# Patient Record
Sex: FEMALE | Race: WHITE | HISPANIC OR LATINO | URBAN - METROPOLITAN AREA
[De-identification: names, ages, dates, MRNs, and addresses within clinical notes are randomized per-mention and may not be internally consistent; named-entity substitution may affect disease eponyms.]

---

## 2024-08-20 ENCOUNTER — HOSPITAL ENCOUNTER (EMERGENCY)
Facility: HOSPITAL | Age: 39
Discharge: HOME | End: 2024-08-20
Attending: OBSTETRICS & GYNECOLOGY
Payer: MEDICAID

## 2024-08-20 ENCOUNTER — HOSPITAL ENCOUNTER (EMERGENCY)
Facility: HOSPITAL | Age: 39
Discharge: ANOTHER HOSPITAL | End: 2024-08-20
Attending: EMERGENCY MEDICINE
Payer: MEDICAID

## 2024-08-20 VITALS
TEMPERATURE: 99 F | WEIGHT: 200 LBS | RESPIRATION RATE: 19 BRPM | HEART RATE: 76 BPM | SYSTOLIC BLOOD PRESSURE: 117 MMHG | DIASTOLIC BLOOD PRESSURE: 65 MMHG | OXYGEN SATURATION: 100 %

## 2024-08-20 VITALS
TEMPERATURE: 98 F | DIASTOLIC BLOOD PRESSURE: 70 MMHG | OXYGEN SATURATION: 99 % | HEART RATE: 83 BPM | SYSTOLIC BLOOD PRESSURE: 127 MMHG

## 2024-08-20 DIAGNOSIS — R10.13 EPIGASTRIC PAIN: Primary | ICD-10-CM

## 2024-08-20 DIAGNOSIS — O47.9 FALSE LABOR: Primary | ICD-10-CM

## 2024-08-20 DIAGNOSIS — O09.30 NO PRENATAL CARE IN CURRENT PREGNANCY: ICD-10-CM

## 2024-08-20 LAB
ALBUMIN SERPL-MCNC: 2.9 G/DL (ref 3.5–5)
ALBUMIN/GLOB SERPL: 0.8 RATIO (ref 1.1–2)
ALP SERPL-CCNC: 137 UNIT/L (ref 40–150)
ALT SERPL-CCNC: 16 UNIT/L (ref 0–55)
ANION GAP SERPL CALC-SCNC: 11 MEQ/L
AST SERPL-CCNC: 15 UNIT/L (ref 5–34)
BACTERIA #/AREA URNS AUTO: ABNORMAL /HPF
BACTERIA #/AREA URNS AUTO: ABNORMAL /HPF
BASOPHILS # BLD AUTO: 0.02 X10(3)/MCL
BASOPHILS NFR BLD AUTO: 0.3 %
BILIRUB SERPL-MCNC: 0.3 MG/DL
BILIRUB UR QL STRIP.AUTO: NEGATIVE
BILIRUB UR QL STRIP.AUTO: NEGATIVE
BUN SERPL-MCNC: 4 MG/DL (ref 7–18.7)
C TRACH DNA SPEC QL NAA+PROBE: NOT DETECTED
CALCIUM SERPL-MCNC: 8.7 MG/DL (ref 8.4–10.2)
CHLORIDE SERPL-SCNC: 109 MMOL/L (ref 98–107)
CLARITY UR: CLEAR
CLARITY UR: CLEAR
CO2 SERPL-SCNC: 18 MMOL/L (ref 22–29)
COLOR UR AUTO: ABNORMAL
COLOR UR AUTO: COLORLESS
CREAT SERPL-MCNC: 0.55 MG/DL (ref 0.55–1.02)
CREAT/UREA NIT SERPL: 7
EOSINOPHIL # BLD AUTO: 0.08 X10(3)/MCL (ref 0–0.9)
EOSINOPHIL NFR BLD AUTO: 1 %
ERYTHROCYTE [DISTWIDTH] IN BLOOD BY AUTOMATED COUNT: 14.3 % (ref 11.5–17)
EST. AVERAGE GLUCOSE BLD GHB EST-MCNC: 128.4 MG/DL
GFR SERPLBLD CREATININE-BSD FMLA CKD-EPI: >60 ML/MIN/1.73/M2
GLOBULIN SER-MCNC: 3.8 GM/DL (ref 2.4–3.5)
GLUCOSE SERPL-MCNC: 100 MG/DL (ref 74–100)
GLUCOSE UR QL STRIP: ABNORMAL
GLUCOSE UR QL STRIP: NORMAL
GROUP & RH: NORMAL
HBA1C MFR BLD: 6.1 %
HBV SURFACE AG SERPL QL IA: NONREACTIVE
HCT VFR BLD AUTO: 34.9 % (ref 37–47)
HCV AB SERPL QL IA: NONREACTIVE
HGB BLD-MCNC: 11.2 G/DL (ref 12–16)
HGB UR QL STRIP: NEGATIVE
HGB UR QL STRIP: NEGATIVE
HIV 1+2 AB+HIV1 P24 AG SERPL QL IA: NONREACTIVE
HYALINE CASTS #/AREA URNS LPF: ABNORMAL /LPF
IMM GRANULOCYTES # BLD AUTO: 0.03 X10(3)/MCL (ref 0–0.04)
IMM GRANULOCYTES NFR BLD AUTO: 0.4 %
INDIRECT COOMBS: NORMAL
KETONES UR QL STRIP: ABNORMAL
KETONES UR QL STRIP: NEGATIVE
LEUKOCYTE ESTERASE UR QL STRIP: NEGATIVE
LEUKOCYTE ESTERASE UR QL STRIP: NEGATIVE
LYMPHOCYTES # BLD AUTO: 2 X10(3)/MCL (ref 0.6–4.6)
LYMPHOCYTES NFR BLD AUTO: 25 %
MCH RBC QN AUTO: 28.5 PG (ref 27–31)
MCHC RBC AUTO-ENTMCNC: 32.1 G/DL (ref 33–36)
MCV RBC AUTO: 88.8 FL (ref 80–94)
MONOCYTES # BLD AUTO: 0.55 X10(3)/MCL (ref 0.1–1.3)
MONOCYTES NFR BLD AUTO: 6.9 %
MUCOUS THREADS URNS QL MICRO: ABNORMAL /LPF
MUCOUS THREADS URNS QL MICRO: ABNORMAL /LPF
N GONORRHOEA DNA SPEC QL NAA+PROBE: NOT DETECTED
NEUTROPHILS # BLD AUTO: 5.31 X10(3)/MCL (ref 2.1–9.2)
NEUTROPHILS NFR BLD AUTO: 66.4 %
NITRITE UR QL STRIP: NEGATIVE
NITRITE UR QL STRIP: NEGATIVE
NRBC BLD AUTO-RTO: 0 %
PH UR STRIP: 7 [PH]
PH UR STRIP: 7.5 [PH]
PLATELET # BLD AUTO: 239 X10(3)/MCL (ref 130–400)
PMV BLD AUTO: 10.1 FL (ref 7.4–10.4)
POTASSIUM SERPL-SCNC: 3.6 MMOL/L (ref 3.5–5.1)
PROT SERPL-MCNC: 6.7 GM/DL (ref 6.4–8.3)
PROT UR QL STRIP: NEGATIVE
PROT UR QL STRIP: NEGATIVE
RBC # BLD AUTO: 3.93 X10(6)/MCL (ref 4.2–5.4)
RBC #/AREA URNS AUTO: ABNORMAL /HPF
RBC #/AREA URNS AUTO: ABNORMAL /HPF
SODIUM SERPL-SCNC: 138 MMOL/L (ref 136–145)
SOURCE (OHS): NORMAL
SP GR UR STRIP.AUTO: 1.01 (ref 1–1.03)
SP GR UR STRIP.AUTO: 1.01 (ref 1–1.03)
SPECIMEN OUTDATE: NORMAL
SQUAMOUS #/AREA URNS LPF: ABNORMAL /HPF
SQUAMOUS #/AREA URNS LPF: ABNORMAL /HPF
T PALLIDUM AB SER QL: NONREACTIVE
UROBILINOGEN UR STRIP-ACNC: NORMAL
UROBILINOGEN UR STRIP-ACNC: NORMAL
WBC # BLD AUTO: 7.99 X10(3)/MCL (ref 4.5–11.5)
WBC #/AREA URNS AUTO: ABNORMAL /HPF
WBC #/AREA URNS AUTO: ABNORMAL /HPF

## 2024-08-20 PROCEDURE — 86901 BLOOD TYPING SEROLOGIC RH(D): CPT | Performed by: OBSTETRICS & GYNECOLOGY

## 2024-08-20 PROCEDURE — 80053 COMPREHEN METABOLIC PANEL: CPT | Performed by: OBSTETRICS & GYNECOLOGY

## 2024-08-20 PROCEDURE — 87591 N.GONORRHOEAE DNA AMP PROB: CPT | Performed by: OBSTETRICS & GYNECOLOGY

## 2024-08-20 PROCEDURE — 86803 HEPATITIS C AB TEST: CPT | Performed by: OBSTETRICS & GYNECOLOGY

## 2024-08-20 PROCEDURE — 86780 TREPONEMA PALLIDUM: CPT | Performed by: OBSTETRICS & GYNECOLOGY

## 2024-08-20 PROCEDURE — 87389 HIV-1 AG W/HIV-1&-2 AB AG IA: CPT | Performed by: OBSTETRICS & GYNECOLOGY

## 2024-08-20 PROCEDURE — 99284 EMERGENCY DEPT VISIT MOD MDM: CPT | Mod: 25

## 2024-08-20 PROCEDURE — 87491 CHLMYD TRACH DNA AMP PROBE: CPT | Performed by: OBSTETRICS & GYNECOLOGY

## 2024-08-20 PROCEDURE — 81001 URINALYSIS AUTO W/SCOPE: CPT | Performed by: OBSTETRICS & GYNECOLOGY

## 2024-08-20 PROCEDURE — 83036 HEMOGLOBIN GLYCOSYLATED A1C: CPT | Performed by: OBSTETRICS & GYNECOLOGY

## 2024-08-20 PROCEDURE — 96360 HYDRATION IV INFUSION INIT: CPT

## 2024-08-20 PROCEDURE — 63600175 PHARM REV CODE 636 W HCPCS: Performed by: EMERGENCY MEDICINE

## 2024-08-20 PROCEDURE — 87340 HEPATITIS B SURFACE AG IA: CPT | Performed by: OBSTETRICS & GYNECOLOGY

## 2024-08-20 PROCEDURE — 86762 RUBELLA ANTIBODY: CPT | Performed by: OBSTETRICS & GYNECOLOGY

## 2024-08-20 PROCEDURE — 86850 RBC ANTIBODY SCREEN: CPT | Performed by: OBSTETRICS & GYNECOLOGY

## 2024-08-20 PROCEDURE — 85025 COMPLETE CBC W/AUTO DIFF WBC: CPT | Performed by: OBSTETRICS & GYNECOLOGY

## 2024-08-20 PROCEDURE — 81001 URINALYSIS AUTO W/SCOPE: CPT | Performed by: EMERGENCY MEDICINE

## 2024-08-20 PROCEDURE — 99285 EMERGENCY DEPT VISIT HI MDM: CPT | Mod: 25,27

## 2024-08-20 RX ORDER — LANOLIN ALCOHOL/MO/W.PET/CERES
1 CREAM (GRAM) TOPICAL DAILY
Status: DISCONTINUED | OUTPATIENT
Start: 2024-08-20 | End: 2024-08-20 | Stop reason: HOSPADM

## 2024-08-20 RX ORDER — DOCUSATE SODIUM 100 MG/1
100 CAPSULE, LIQUID FILLED ORAL 2 TIMES DAILY
Qty: 60 CAPSULE | Refills: 0 | Status: SHIPPED | OUTPATIENT
Start: 2024-08-20

## 2024-08-20 RX ADMIN — SODIUM CHLORIDE, POTASSIUM CHLORIDE, SODIUM LACTATE AND CALCIUM CHLORIDE 1000 ML: 600; 310; 30; 20 INJECTION, SOLUTION INTRAVENOUS at 09:08

## 2024-08-20 NOTE — ED PROVIDER NOTES
ED PROVIDER NOTE  2024    CHIEF COMPLAINT:   Chief Complaint   Patient presents with    Abdominal Pain     Pt in with complaints of abdominal pain x 3 days. Pt reports being 8 months pregnant with no prenatal care. .  Pt has no complaints of vaginal bleeding or fluids.       HISTORY OF PRESENT ILLNESS:   Camilla Cruz is a 39 y.o. female who presents with chief complaint Cramping.  Patient is  approximately 31 weeks gestation based on LMP who presents with uterine cramping that began 3 days ago. Nothing makes it better or worse. Denies vaginal bleeding or gush of fluid.    The history is provided by the patient. The history is limited by a language barrier. A  was used.         REVIEW OF SYSTEMS: as noted in the HPI.  NURSING NOTES REVIEWED      PAST MEDICAL/SURGICAL HISTORY: History reviewed. No pertinent past medical history. History reviewed. No pertinent surgical history.    FAMILY HISTORY: No family history on file.    SOCIAL HISTORY:   Social History     Tobacco Use    Smoking status: Never    Smokeless tobacco: Never   Substance Use Topics    Alcohol use: Never    Drug use: Never       ALLERGIES: Review of patient's allergies indicates:  No Known Allergies    PHYSICAL EXAM:  Initial Vitals [24 0922]   BP Pulse Resp Temp SpO2   127/74 81 14 98.6 °F (37 °C) 97 %      MAP       --         Physical Exam    Nursing note and vitals reviewed.  Constitutional: She appears well-developed and well-nourished.   HENT:   Head: Normocephalic and atraumatic.   Mouth/Throat: Uvula is midline and mucous membranes are normal.   Eyes: EOM are normal. Pupils are equal, round, and reactive to light.   Neck: Trachea normal. Neck supple.   Cardiovascular:  Normal rate, regular rhythm, intact distal pulses and normal pulses.           Pulmonary/Chest: Effort normal and breath sounds normal.   Abdominal: Abdomen is soft. Bowel sounds are normal.   Gravid abdomen There is no rebound  and no guarding.   Genitourinary:    Pelvic exam was performed with patient supine.      Genitourinary Comments: Cervical os closed.     Musculoskeletal:         General: Normal range of motion.      Cervical back: Neck supple.     Neurological: She is alert and oriented to person, place, and time. GCS eye subscore is 4. GCS verbal subscore is 5. GCS motor subscore is 6.   Skin: Skin is warm and dry.   Psychiatric: She has a normal mood and affect. Her speech is normal. Thought content normal.         RESULTS:  Labs Reviewed   URINALYSIS, REFLEX TO URINE CULTURE - Abnormal       Result Value    Color, UA Light-Yellow      Appearance, UA Clear      Specific Gravity, UA 1.013      pH, UA 7.0      Protein, UA Negative      Glucose, UA 1+ (*)     Ketones, UA Negative      Blood, UA Negative      Bilirubin, UA Negative      Urobilinogen, UA Normal      Nitrites, UA Negative      Leukocyte Esterase, UA Negative      RBC, UA 0-5      WBC, UA 0-5      Bacteria, UA None Seen      Squamous Epithelial Cells, UA Trace (*)     Mucous, UA Trace (*)     Hyaline Casts, UA None Seen       Imaging Results    None         PROCEDURES:  Procedures    ECG:       ED COURSE AND MEDICAL DECISION MAKING:  Medications   lactated ringers bolus 1,000 mL (0 mLs Intravenous Stopped 24 1032)     ED Course as of 24 0700   Tue Aug 20, 2024   1006 NITRITE UA: Negative [IB]   1006 Leukocyte Esterase, UA: Negative [IB]   1006 RBC, UA: 0-5 [IB]   1006 WBC, UA: 0-5 [IB]   1006 Bacteria, UA: None Seen [IB]      ED Course User Index  [IB] Sly Merino DO        Medical Decision Making  39-year-old female  approximately 31 weeks gestation based on LMP no prenatal care presents with uterine contractions for the past 3 days that she states occurs about every hour, denies any vaginal bleeding or gushing of fluid.  Cervical os closed.  Good fetal heart rate variability on tocodynamometer, no organized uterine contractions seen.  Transfer  process initiated so that she could have OB monitoring.  I have spoken with the patient and/or caregivers. I have explained the patient's condition, diagnoses and treatment plan based on the information available to me at this time. I have answered the patient's and/or caregiver's questions and addressed any concerns. The patient and/or caregivers have as good an understanding of the patient's diagnosis, condition and treatment plan as can be expected at this point. The patient has been stabilized within the capability of the emergency department. The patient will be transported for further care and management or will be moved to an observation or inpatient service. I have communicated with the staff or medical practitioner taking over this patient's care.    Amount and/or Complexity of Data Reviewed  Labs: ordered. Decision-making details documented in ED Course.    Risk  Decision regarding hospitalization.        CLINICAL IMPRESSION:  1. False labor        DISPOSITION:   ED Disposition Condition    Transfer to Another Facility Stable                    Sly Merino,   08/20/24 0941       Sly Merino,   08/21/24 0700

## 2024-08-20 NOTE — ED PROVIDER NOTES
Encounter Date: 8/20/2024       History     Chief Complaint   Patient presents with    Abdominal Pain     IUP 31.3 wks c/o of abdominal since 3 days ago.Patient denies leaking of fluid and vaginal bleeding. Patient has no prenatal care during this pregnancy. Patient has an appointment with the [facility] clinc for Sept 24.     HPI  Review of patient's allergies indicates:  No Known Allergies  History reviewed. No pertinent past medical history.  History reviewed. No pertinent surgical history.  No family history on file.  Social History     Tobacco Use    Smoking status: Never    Smokeless tobacco: Never   Substance Use Topics    Alcohol use: Never    Drug use: Never     Review of Systems    Physical Exam     Initial Vitals   BP Pulse Resp Temp SpO2   08/20/24 1158 08/20/24 1158 -- 08/20/24 1158 08/20/24 1200   127/70 81  98.2 °F (36.8 °C) 99 %      MAP       --                Physical Exam    ED Course   Procedures  Labs Reviewed   URINALYSIS, REFLEX TO URINE CULTURE - Abnormal       Result Value    Color, UA Colorless      Appearance, UA Clear      Specific Gravity, UA 1.010      pH, UA 7.5      Protein, UA Negative      Glucose, UA Normal      Ketones, UA 1+ (*)     Blood, UA Negative      Bilirubin, UA Negative      Urobilinogen, UA Normal      Nitrites, UA Negative      Leukocyte Esterase, UA Negative      RBC, UA None Seen      WBC, UA 0-5      Bacteria, UA None Seen      Squamous Epithelial Cells, UA Trace      Mucous, UA Trace (*)    COMPREHENSIVE METABOLIC PANEL - Abnormal    Sodium 138      Potassium 3.6      Chloride 109 (*)     CO2 18 (*)     Glucose 100      Blood Urea Nitrogen 4.0 (*)     Creatinine 0.55      Calcium 8.7      Protein Total 6.7      Albumin 2.9 (*)     Globulin 3.8 (*)     Albumin/Globulin Ratio 0.8 (*)     Bilirubin Total 0.3            ALT 16      AST 15      eGFR >60      Anion Gap 11.0      BUN/Creatinine Ratio 7     CBC WITH DIFFERENTIAL - Abnormal    WBC 7.99      RBC 3.93 (*)      Hgb 11.2 (*)     Hct 34.9 (*)     MCV 88.8      MCH 28.5      MCHC 32.1 (*)     RDW 14.3      Platelet 239      MPV 10.1      Neut % 66.4      Lymph % 25.0      Mono % 6.9      Eos % 1.0      Basophil % 0.3      Lymph # 2.00      Neut # 5.31      Mono # 0.55      Eos # 0.08      Baso # 0.02      IG# 0.03      IG% 0.4      NRBC% 0.0     SYPHILIS ANTIBODY (WITH REFLEX RPR) - Normal    Syphilis Antibody Nonreactive     CHLAMYDIA/GONORRHOEAE(GC), PCR    Chlamydia trachomatis PCR Not Detected      N. gonorrhea PCR Not Detected      Source Urine      Narrative:     The Xpert CT/NG test, performed on the GeneXpert system is a qualitative in vitro real-time polymerase chain reaction (PCR) test for the automated detected and differentiation for genomic DNA from Chlamydia trachomatis (CT) and/or Neisseria gonorrhoeae (NG).   HEMOGLOBIN A1C    Hemoglobin A1c 6.1      Estimated Average Glucose 128.4     CBC W/ AUTO DIFFERENTIAL    Narrative:     The following orders were created for panel order CBC auto differential.  Procedure                               Abnormality         Status                     ---------                               -----------         ------                     CBC with Differential[[id number]]       Abnormal            Final result                 Please view results for these tests on the individual orders.   RUBELLA ANTIBODY, IGG   HIV 1 / 2 ANTIBODY   HEPATITIS B SURFACE ANTIGEN   HEPATITIS C ANTIBODY   TYPE & SCREEN    Group & Rh A POS      Indirect Sue GEL NEG      Specimen Outdate 08/23/2024 23:59     ABORH RETYPE          Imaging Results              US OB Limited 1 Or More Gestations (Final result)  Result time 08/20/24 13:59:24      Final result by Laz Ly MD (08/20/24 13:59:24)                   Impression:      Single intrauterine gestation with average ultrasound age 31 weeks 6 days.  Biophysical profile 8/8.      Electronically signed by: Laz  Aliyah  Date:    08/20/2024  Time:    13:59               Narrative:    EXAMINATION:  US OB 14+ WEEKS, TRANSABDOM W/ BIOPHYSICAL PROFILE, W/O NST, SINGLE GESTATION (XPD); US OB LIMITED 1 OR MORE GESTATIONS    CLINICAL HISTORY:  No prenatal care    COMPARISON:  No priors    FINDINGS:  There is a single intrauterine gestation demonstrated in breech presentation. Fetal heart rate was 139 BPM.  Anterior placenta without abnormality evident.  Detailed fetal anatomic survey was not performed.  The VIVI is 17 cm.  Single deepest pocket 6 cm.    Average ultrasound age is 31 weeks 6 days.    BPD = 8.1 cm-32 weeks 5 days.    HC = 9.2 cm-32 weeks 2 days.    AC = 28.3 cm-32 weeks 2 days.    FL = 5.7 cm-30 weeks 0 days.    Estimated fetal weight-1811 grams +/-272 grams (4 pounds 0 ounces +/-10 ounces).  This is 51st percentile.    Biophysical profile 8/8.                                       US OB 14+ Weeks TransAbd, w/Biophysical Profile, w/o NST, Single Gestation (xpd) (Final result)  Result time 08/20/24 13:59:24      Final result by Laz Ly MD (08/20/24 13:59:24)                   Impression:      Single intrauterine gestation with average ultrasound age 31 weeks 6 days.  Biophysical profile 8/8.      Electronically signed by: Laz Ly  Date:    08/20/2024  Time:    13:59               Narrative:    EXAMINATION:  US OB 14+ WEEKS, TRANSABDOM W/ BIOPHYSICAL PROFILE, W/O NST, SINGLE GESTATION (XPD); US OB LIMITED 1 OR MORE GESTATIONS    CLINICAL HISTORY:  No prenatal care    COMPARISON:  No priors    FINDINGS:  There is a single intrauterine gestation demonstrated in breech presentation. Fetal heart rate was 139 BPM.  Anterior placenta without abnormality evident.  Detailed fetal anatomic survey was not performed.  The VIVI is 17 cm.  Single deepest pocket 6 cm.    Average ultrasound age is 31 weeks 6 days.    BPD = 8.1 cm-32 weeks 5 days.    HC = 9.2 cm-32 weeks 2 days.    AC = 28.3 cm-32 weeks 2 days.    FL = 5.7  cm-30 weeks 0 days.    Estimated fetal weight-1811 grams +/-272 grams (4 pounds 0 ounces +/-10 ounces).  This is 51st percentile.    Biophysical profile .                                       Medications - No data to display  Medical Decision Making  Amount and/or Complexity of Data Reviewed  Labs: ordered.  Radiology: ordered.                                      Clinical Impression:   This  @ 31 3/7 weeks gestation, presents with complaints of abdominal pain and cramping. Denies any vaginal bleeding, or leakage of fluid. Reports +FM  No prenatal care thus far this pregnancy.   History of  x 4  Denies any complications this pregnancy  Denies any medical history    Tracing: reactive  VE: C/L/P    A/P:  labor ruled out  -pain, bleeding, movement precautions given  -initial prenatal panel done  -complete OB ultrasound done, no abnormal findings  -scheduled for a visit at [facility] on   -recommended to return if any abnormal occurences       ED Disposition Condition    Discharge           ED Prescriptions    None       Follow-up Information       Follow up With Specialties Details Why Contact Info    [facility]  Follow up Keep scheduled appointment [id number] [facility] [id number] [phone]               Junaid Washington MD  [phone]

## 2024-08-20 NOTE — DISCHARGE INSTRUCTIONS
Return to hospital with decreased fetal movement, vaginal bleeding, leaking of fluid, or abdominal pain every 3-5 minutes consistent for 2 hours.

## 2024-08-21 LAB
RUBV IGG SERPL IA-ACNC: 1.3
RUBV IGG SERPL QL IA: POSITIVE

## 2024-09-24 ENCOUNTER — OFFICE VISIT (OUTPATIENT)
Dept: FAMILY MEDICINE | Facility: CLINIC | Age: 39
End: 2024-09-24
Payer: MEDICAID

## 2024-09-24 ENCOUNTER — HOSPITAL ENCOUNTER (OUTPATIENT)
Dept: RADIOLOGY | Facility: HOSPITAL | Age: 39
Discharge: HOME | End: 2024-09-24
Attending: OBSTETRICS & GYNECOLOGY
Payer: MEDICAID

## 2024-09-24 ENCOUNTER — HOSPITAL ENCOUNTER (INPATIENT)
Facility: HOSPITAL | Age: 39
LOS: 4 days | Discharge: HOME | End: 2024-09-28
Attending: OBSTETRICS & GYNECOLOGY | Admitting: OBSTETRICS & GYNECOLOGY
Payer: MEDICAID

## 2024-09-24 VITALS
OXYGEN SATURATION: 98 % | HEART RATE: 70 BPM | HEIGHT: 61 IN | TEMPERATURE: 98 F | SYSTOLIC BLOOD PRESSURE: 110 MMHG | BODY MASS INDEX: 34.51 KG/M2 | DIASTOLIC BLOOD PRESSURE: 72 MMHG | WEIGHT: 182.81 LBS | RESPIRATION RATE: 16 BRPM

## 2024-09-24 DIAGNOSIS — O26.643 CHOLESTASIS DURING PREGNANCY IN THIRD TRIMESTER: ICD-10-CM

## 2024-09-24 DIAGNOSIS — Z34.90 ENCOUNTER FOR INDUCTION OF LABOR: Primary | ICD-10-CM

## 2024-09-24 DIAGNOSIS — Z34.90 PREGNANCY: ICD-10-CM

## 2024-09-24 DIAGNOSIS — Z3A.36 36 WEEKS GESTATION OF PREGNANCY: Primary | ICD-10-CM

## 2024-09-24 LAB
ALBUMIN SERPL-MCNC: 2.6 G/DL (ref 3.5–5)
ALBUMIN/GLOB SERPL: 0.7 RATIO (ref 1.1–2)
ALP SERPL-CCNC: 253 UNIT/L (ref 40–150)
ALT SERPL-CCNC: 129 UNIT/L (ref 0–55)
ANION GAP SERPL CALC-SCNC: 8 MEQ/L
AST SERPL-CCNC: 46 UNIT/L (ref 5–34)
BACTERIA #/AREA URNS AUTO: ABNORMAL /HPF
BASOPHILS # BLD AUTO: 0.02 X10(3)/MCL
BASOPHILS NFR BLD AUTO: 0.3 %
BILIRUB SERPL-MCNC: 0.4 MG/DL
BILIRUB SERPL-MCNC: NEGATIVE MG/DL
BILIRUB UR QL STRIP.AUTO: NEGATIVE
BLOOD URINE, POC: NEGATIVE
BUN SERPL-MCNC: 8 MG/DL (ref 7–18.7)
C TRACH DNA SPEC QL NAA+PROBE: NOT DETECTED
CALCIUM SERPL-MCNC: 8.4 MG/DL (ref 8.4–10.2)
CAOX CRY UR QL COMP ASSIST: ABNORMAL
CHLORIDE SERPL-SCNC: 114 MMOL/L (ref 98–107)
CLARITY UR: CLEAR
CLARITY, POC UA: CLEAR
CO2 SERPL-SCNC: 18 MMOL/L (ref 22–29)
COLOR UR AUTO: ABNORMAL
COLOR, POC UA: YELLOW
CREAT SERPL-MCNC: 0.59 MG/DL (ref 0.55–1.02)
CREAT/UREA NIT SERPL: 14
EOSINOPHIL # BLD AUTO: 0.14 X10(3)/MCL (ref 0–0.9)
EOSINOPHIL NFR BLD AUTO: 2.1 %
ERYTHROCYTE [DISTWIDTH] IN BLOOD BY AUTOMATED COUNT: 14.2 % (ref 11.5–17)
GFR SERPLBLD CREATININE-BSD FMLA CKD-EPI: >60 ML/MIN/1.73/M2
GLOBULIN SER-MCNC: 3.5 GM/DL (ref 2.4–3.5)
GLUCOSE 1H P 100 G GLC PO SERPL-MCNC: 222 MG/DL (ref 100–180)
GLUCOSE SERPL-MCNC: 97 MG/DL (ref 74–100)
GLUCOSE UR QL STRIP: NEGATIVE
GLUCOSE UR QL STRIP: NORMAL
GROUP & RH: NORMAL
HCT VFR BLD AUTO: 34.2 % (ref 37–47)
HGB BLD-MCNC: 11.1 G/DL (ref 12–16)
HGB UR QL STRIP: NEGATIVE
HIV 1+2 AB+HIV1 P24 AG SERPL QL IA: NONREACTIVE
HYALINE CASTS #/AREA URNS LPF: ABNORMAL /LPF
IMM GRANULOCYTES # BLD AUTO: 0.1 X10(3)/MCL (ref 0–0.04)
IMM GRANULOCYTES NFR BLD AUTO: 1.5 %
INDIRECT COOMBS: NORMAL
KETONES UR QL STRIP: NEGATIVE
KETONES UR QL STRIP: NEGATIVE
LEUKOCYTE ESTERASE UR QL STRIP: NEGATIVE
LEUKOCYTE ESTERASE URINE, POC: NEGATIVE
LYMPHOCYTES # BLD AUTO: 1.56 X10(3)/MCL (ref 0.6–4.6)
LYMPHOCYTES NFR BLD AUTO: 22.9 %
MCH RBC QN AUTO: 28.5 PG (ref 27–31)
MCHC RBC AUTO-ENTMCNC: 32.5 G/DL (ref 33–36)
MCV RBC AUTO: 87.7 FL (ref 80–94)
MONOCYTES # BLD AUTO: 0.39 X10(3)/MCL (ref 0.1–1.3)
MONOCYTES NFR BLD AUTO: 5.7 %
MUCOUS THREADS URNS QL MICRO: ABNORMAL /LPF
N GONORRHOEA DNA SPEC QL NAA+PROBE: NOT DETECTED
NEUTROPHILS # BLD AUTO: 4.6 X10(3)/MCL (ref 2.1–9.2)
NEUTROPHILS NFR BLD AUTO: 67.5 %
NITRITE UR QL STRIP: NEGATIVE
NITRITE, POC UA: NEGATIVE
NRBC BLD AUTO-RTO: 0 %
PH UR STRIP: 6 [PH]
PH, POC UA: 6
PLATELET # BLD AUTO: 191 X10(3)/MCL (ref 130–400)
PMV BLD AUTO: 11.3 FL (ref 7.4–10.4)
POCT GLUCOSE: 155 MG/DL (ref 70–110)
POTASSIUM SERPL-SCNC: 3.7 MMOL/L (ref 3.5–5.1)
PROT SERPL-MCNC: 6.1 GM/DL (ref 6.4–8.3)
PROT UR QL STRIP: ABNORMAL
PROTEIN, POC: NEGATIVE
RBC # BLD AUTO: 3.9 X10(6)/MCL (ref 4.2–5.4)
RBC #/AREA URNS AUTO: ABNORMAL /HPF
SODIUM SERPL-SCNC: 140 MMOL/L (ref 136–145)
SOURCE (OHS): NORMAL
SP GR UR STRIP.AUTO: 1.02 (ref 1–1.03)
SPECIFIC GRAVITY, POC UA: 1.02
SPECIMEN OUTDATE: NORMAL
SQUAMOUS #/AREA URNS LPF: ABNORMAL /HPF
T PALLIDUM AB SER QL: NONREACTIVE
UROBILINOGEN UR STRIP-ACNC: NORMAL
UROBILINOGEN, POC UA: 0.2
WBC # BLD AUTO: 6.81 X10(3)/MCL (ref 4.5–11.5)
WBC #/AREA URNS AUTO: ABNORMAL /HPF

## 2024-09-24 PROCEDURE — 90715 TDAP VACCINE 7 YRS/> IM: CPT | Mod: PBBFAC

## 2024-09-24 PROCEDURE — 85660 RBC SICKLE CELL TEST: CPT

## 2024-09-24 PROCEDURE — 87653 STREP B DNA AMP PROBE: CPT

## 2024-09-24 PROCEDURE — 86900 BLOOD TYPING SEROLOGIC ABO: CPT

## 2024-09-24 PROCEDURE — 80053 COMPREHEN METABOLIC PANEL: CPT

## 2024-09-24 PROCEDURE — 82950 GLUCOSE TEST: CPT

## 2024-09-24 PROCEDURE — 25000003 PHARM REV CODE 250

## 2024-09-24 PROCEDURE — G0378 HOSPITAL OBSERVATION PER HR: HCPCS

## 2024-09-24 PROCEDURE — 87389 HIV-1 AG W/HIV-1&-2 AB AG IA: CPT

## 2024-09-24 PROCEDURE — 11000001 HC ACUTE MED/SURG PRIVATE ROOM

## 2024-09-24 PROCEDURE — 87086 URINE CULTURE/COLONY COUNT: CPT

## 2024-09-24 PROCEDURE — 96372 THER/PROPH/DIAG INJ SC/IM: CPT

## 2024-09-24 PROCEDURE — 86787 VARICELLA-ZOSTER ANTIBODY: CPT

## 2024-09-24 PROCEDURE — 81001 URINALYSIS AUTO W/SCOPE: CPT

## 2024-09-24 PROCEDURE — 86850 RBC ANTIBODY SCREEN: CPT

## 2024-09-24 PROCEDURE — 81002 URINALYSIS NONAUTO W/O SCOPE: CPT | Mod: PBBFAC

## 2024-09-24 PROCEDURE — 87081 CULTURE SCREEN ONLY: CPT

## 2024-09-24 PROCEDURE — 63600175 PHARM REV CODE 636 W HCPCS

## 2024-09-24 PROCEDURE — 82239 BILE ACIDS TOTAL: CPT

## 2024-09-24 PROCEDURE — 86780 TREPONEMA PALLIDUM: CPT

## 2024-09-24 PROCEDURE — 87591 N.GONORRHOEAE DNA AMP PROB: CPT

## 2024-09-24 PROCEDURE — 90471 IMMUNIZATION ADMIN: CPT | Mod: PBBFAC

## 2024-09-24 PROCEDURE — 87624 HPV HI-RISK TYP POOLED RSLT: CPT

## 2024-09-24 PROCEDURE — 85025 COMPLETE CBC W/AUTO DIFF WBC: CPT

## 2024-09-24 PROCEDURE — 87491 CHLMYD TRACH DNA AMP PROBE: CPT

## 2024-09-24 PROCEDURE — G0379 DIRECT REFER HOSPITAL OBSERV: HCPCS

## 2024-09-24 PROCEDURE — 99215 OFFICE O/P EST HI 40 MIN: CPT | Mod: PBBFAC,25

## 2024-09-24 PROCEDURE — 3E0234Z INTRODUCTION OF SERUM, TOXOID AND VACCINE INTO MUSCLE, PERCUTANEOUS APPROACH: ICD-10-PCS | Performed by: OBSTETRICS & GYNECOLOGY

## 2024-09-24 PROCEDURE — 76805 OB US >/= 14 WKS SNGL FETUS: CPT | Mod: TC

## 2024-09-24 PROCEDURE — 36415 COLL VENOUS BLD VENIPUNCTURE: CPT

## 2024-09-24 PROCEDURE — 86901 BLOOD TYPING SEROLOGIC RH(D): CPT

## 2024-09-24 PROCEDURE — 88174 CYTOPATH C/V AUTO IN FLUID: CPT

## 2024-09-24 RX ORDER — URSODIOL 250 MG/1
500 TABLET, FILM COATED ORAL 2 TIMES DAILY WITH MEALS
Status: DISCONTINUED | OUTPATIENT
Start: 2024-09-24 | End: 2024-09-27

## 2024-09-24 RX ORDER — SIMETHICONE 80 MG
1 TABLET,CHEWABLE ORAL EVERY 6 HOURS PRN
Status: DISCONTINUED | OUTPATIENT
Start: 2024-09-24 | End: 2024-09-25

## 2024-09-24 RX ORDER — INSULIN ASPART 100 [IU]/ML
0-10 INJECTION, SOLUTION INTRAVENOUS; SUBCUTANEOUS
Status: DISCONTINUED | OUTPATIENT
Start: 2024-09-24 | End: 2024-09-27

## 2024-09-24 RX ORDER — GLUCAGON 1 MG
1 KIT INJECTION
Status: DISCONTINUED | OUTPATIENT
Start: 2024-09-24 | End: 2024-09-27

## 2024-09-24 RX ORDER — ACETAMINOPHEN 325 MG/1
650 TABLET ORAL EVERY 6 HOURS PRN
Status: DISCONTINUED | OUTPATIENT
Start: 2024-09-24 | End: 2024-09-28 | Stop reason: HOSPADM

## 2024-09-24 RX ORDER — BETAMETHASONE SODIUM PHOSPHATE AND BETAMETHASONE ACETATE 3; 3 MG/ML; MG/ML
12 INJECTION, SUSPENSION INTRA-ARTICULAR; INTRALESIONAL; INTRAMUSCULAR; SOFT TISSUE
Status: DISCONTINUED | OUTPATIENT
Start: 2024-09-24 | End: 2024-09-24

## 2024-09-24 RX ORDER — ONDANSETRON 4 MG/1
8 TABLET, ORALLY DISINTEGRATING ORAL EVERY 8 HOURS PRN
Status: DISCONTINUED | OUTPATIENT
Start: 2024-09-24 | End: 2024-09-25

## 2024-09-24 RX ORDER — PRENATAL WITH FERROUS FUM AND FOLIC ACID 3080; 920; 120; 400; 22; 1.84; 3; 20; 10; 1; 12; 200; 27; 25; 2 [IU]/1; [IU]/1; MG/1; [IU]/1; MG/1; MG/1; MG/1; MG/1; MG/1; MG/1; UG/1; MG/1; MG/1; MG/1; MG/1
1 TABLET ORAL DAILY
Status: DISCONTINUED | OUTPATIENT
Start: 2024-09-24 | End: 2024-09-28 | Stop reason: HOSPADM

## 2024-09-24 RX ORDER — AMOXICILLIN 250 MG
1 CAPSULE ORAL NIGHTLY PRN
Status: DISCONTINUED | OUTPATIENT
Start: 2024-09-24 | End: 2024-09-28 | Stop reason: HOSPADM

## 2024-09-24 RX ORDER — DIPHENHYDRAMINE HYDROCHLORIDE 50 MG/ML
25 INJECTION INTRAMUSCULAR; INTRAVENOUS EVERY 4 HOURS PRN
Status: DISCONTINUED | OUTPATIENT
Start: 2024-09-24 | End: 2024-09-28 | Stop reason: HOSPADM

## 2024-09-24 RX ORDER — IBUPROFEN 200 MG
16 TABLET ORAL
Status: DISCONTINUED | OUTPATIENT
Start: 2024-09-24 | End: 2024-09-27

## 2024-09-24 RX ORDER — MUPIROCIN 20 MG/G
OINTMENT TOPICAL 2 TIMES DAILY
Status: DISCONTINUED | OUTPATIENT
Start: 2024-09-24 | End: 2024-09-28 | Stop reason: HOSPADM

## 2024-09-24 RX ORDER — IBUPROFEN 200 MG
24 TABLET ORAL
Status: DISCONTINUED | OUTPATIENT
Start: 2024-09-24 | End: 2024-09-27

## 2024-09-24 RX ORDER — DIPHENHYDRAMINE HCL 25 MG
25 CAPSULE ORAL EVERY 4 HOURS PRN
Status: DISCONTINUED | OUTPATIENT
Start: 2024-09-24 | End: 2024-09-28 | Stop reason: HOSPADM

## 2024-09-24 RX ORDER — SODIUM CHLORIDE 0.9 % (FLUSH) 0.9 %
10 SYRINGE (ML) INJECTION
Status: DISCONTINUED | OUTPATIENT
Start: 2024-09-24 | End: 2024-09-26 | Stop reason: SDUPTHER

## 2024-09-24 RX ADMIN — PRENATAL VITAMINS-IRON FUMARATE 27 MG IRON-FOLIC ACID 0.8 MG TABLET 1 TABLET: at 01:09

## 2024-09-24 RX ADMIN — TETANUS TOXOID, REDUCED DIPHTHERIA TOXOID AND ACELLULAR PERTUSSIS VACCINE, ADSORBED 0.5 ML: 5; 2.5; 8; 8; 2.5 SUSPENSION INTRAMUSCULAR at 10:09

## 2024-09-24 RX ADMIN — INSULIN ASPART 1 UNITS: 100 INJECTION, SOLUTION INTRAVENOUS; SUBCUTANEOUS at 07:09

## 2024-09-24 RX ADMIN — URSODIOL 500 MG: 250 TABLET, FILM COATED ORAL at 05:09

## 2024-09-24 RX ADMIN — BETAMETHASONE ACETATE AND BETAMETHASONE SODIUM PHOSPHATE 12 MG: 3; 3 INJECTION, SUSPENSION INTRA-ARTICULAR; INTRALESIONAL; INTRAMUSCULAR; SOFT TISSUE at 01:09

## 2024-09-24 NOTE — PROGRESS NOTES
FETAL ASSESSMENT REPORT    RE: Camilla Cruz  MRN:  [id number]  :  1985  AGE:  39 y.o.    Date:  2024    [facility] PHYSICIAN[facility]    Allergies: Patient has no known allergies.    Camilla is a 39 y.o.  at 36w3d gestational age here today for a NST.    10/19/2024, by Last Menstrual Period    MEDICATIONS AT TIME OF TEST:    No current facility-administered medications for this visit.     No current outpatient medications on file.     Facility-Administered Medications Ordered in Other Visits   Medication Dose Route Frequency Provider Last Rate Last Admin    acetaminophen tablet 650 mg  650 mg Oral Q6H PRN Iza Knight MD        betamethasone acetate-betamethasone sodium phosphate injection 12 mg  12 mg Intramuscular Q24H Iza Knight MD        diphenhydrAMINE capsule 25 mg  25 mg Oral Q4H PRN Iza Knight MD        diphenhydrAMINE injection 25 mg  25 mg Intravenous Q4H PRN Iza Knight MD        mupirocin 2 % ointment   Nasal BID Iza Knight MD        ondansetron disintegrating tablet 8 mg  8 mg Oral Q8H PRN Iza Knight MD        prenatal vitamin oral tablet  1 tablet Oral Daily Iza Knight MD        senna-docusate 8.6-50 mg per tablet 1 tablet  1 tablet Oral Nightly PRN Iza Knight MD        simethicone chewable tablet 80 mg  1 tablet Oral Q6H PRN Iza Knight MD        sodium chloride 0.9% flush 10 mL  10 mL Intravenous PRN Iza Knight MD        ursodioL tablet 500 mg  500 mg Oral BID WM Iza Knight MD           Indication: cholestasis of pregnancy    Interpretation:  140 BPM baseline    Variability:  Moderate    Accelerations:  Present    Decelerations:  None    Fetal Movement: Yes      Assessment: Reactive NST    Plan: Admit to [facility] for Cholestasis      Mukul Noriega MD  2024; 1:12 PM

## 2024-09-24 NOTE — PATIENT INSTRUCTIONS
Well Child Exam    About this topic  A well child exam is a visit with your child's doctor to check your child's health. The doctor will check your child's growth, progress, and shot record. It is also a time for you to ask your child's doctor any questions you have about your child's health. Your child will have a full exam during the office visit. Other things that are sometimes checked are hearing, eyesight, and urine or blood tests. The doctor may give shots during your child's well visit.    General    Getting Ready for a Well Child Exam    A well child exam is a good time for you to talk with your child's doctor about any of these topics:    Eating habits or diet    How your child acts    Sleep issues    Growth    Safety    Vaccines    Toilet training    Teen years    How your child is doing in school or any learning concerns    Home life    You may want to make a written list of the things you want to talk about with your child's doctor. Be sure to bring your list of questions to your child's well visit. You may also want to do some research on your own before your office visit by reading books or looking at Web sites. Other family members, child caregivers, and grandparents may be able to help you too. Your child's doctor may ask also you about your family's health history or if your child is around anyone who smokes.    The Exam    The doctor measures your child's weight, height, and sometimes head size or body mass index (BMI). The doctor plots these numbers on a growth curve. The growth curve gives a picture of your baby's growth at each visit. The doctor may check your child's temperature, blood pressure, breathing, and heart rate. The doctor may listen to your child's heart, lungs, and belly. Your doctor will do a full exam of your child from the head to the toes.    Growth and Development Questions    Your doctor will ask you about your child's progress. The doctor will focus on the skills that are  likely to happen at your child's age. Some of these are motor skills like rolling over, walking, and running, while others are social skills, or how your child interacts with other people. Your child's doctor will also ask you how your child is doing in school.    Help for Parents    Your doctor will talk with you about any concerns you have about your child during this visit. The doctor may also talk with you about:    Getting family help or other support    Ways to help your child's brain growth    How your child plays and acts with others    Ways to help your child exercise    Safety    Eating habits    Vaccines    Quitting smoking    Help if you have a low mood after having a baby    Shots or Vaccines    It is important for your child to get shots on time. This protects from very serious illnesses like pertussis, measles, or some kinds of pneumonia. Sometimes, your child may need more than one dose of vaccine. The vaccines used today are safer than ever. Talk to your doctor if you have any questions or concerns about giving your child vaccines.    Well Child Exam Schedule    The American Academy of Pediatrics (AAP) suggests this plan for well child visits:    [place] (3 to 5 days old)    1 month old    2 months old    4 months old    6 months old    9 months old    12 months old    15 months old    18 months old    2 years old    30 months old    3 years old    4 years old    Once each year until age 21    Well child exams are very important. Since your child is healthy at this visit and it is scheduled ahead of time, you can think about things you want to ask your child's doctor. Be sure to follow the above plan for well child visits as well as any other visits your child's doctor suggests.    Where can I learn more?    Centers for Disease Control and Prevention    http://www.cdc.gov/vaccines     Healthy  Children    https://www.healthychildren.org/English/family-life/health-management/Pages/Well-Child-Care-A-Check-Up-for-Success.aspx    Disclaimer.  This generalized information is a limited summary of diagnosis, treatment, and/or medication information. It is not meant to be comprehensive and should be used as a tool to help the user understand and/or assess potential diagnostic and treatment options. It does NOT include all information about conditions, treatments, medications, side effects, or risks that may apply to a specific patient. It is not intended to be medical advice or a substitute for the medical advice, diagnosis, or treatment of a health care provider based on the health care provider's examination and assessment of a patients specific and unique circumstances. Patients must speak with a health care provider for complete information about their health, medical questions, and treatment options, including any risks or benefits regarding use of medications. This information does not endorse any treatments or medications as safe, effective, or approved for treating a specific patient. [employer] and its affiliates disclaim any warranty or liability relating to this information or the use thereof. The use of this information is governed by the Terms of Use, available at Terms of Use. ©2022 [employer] and its affiliates and/or licensors. All rights reserved.

## 2024-09-24 NOTE — H&P
U OBSTETRICS ANTEPARTUM HISTORY & PHYSICAL    SUBJECTIVE     HPI:  Camilla Cruz is a 39 y.o.  @ 36w3d by LMP who presents for evaluation of suspected intrahepatic cholestasis of pregnancy. Pregnancy complicated by h/o ICP in 4 prior pregnancies, h/o  delivery x 3 at 36w0d for ICP, late PNC, and AMA.    Patient sent from clinic appointment today due to complaint of severe itching. Patient reports diffuse itching that has been ongoing for the last 7 weeks that has worsened recently. Worse on her upper outer thighs, abdomen and upper back. Notes the itching is worse at night and interferes with her sleep. Says that these symptoms feels like what she experienced in her previous pregnancies.    Denies contractions, vaginal bleeding, and leaking fluid. Normal fetal movement.  Denies SOB, chest pain, headache, vision changes, RUQ pain, fever, and chills.    PNC with [facility].    There are no problems to display for this patient.    Gynecologic History:  STD Hx: denies  Abnormal Paps: denies    Obstetrical History:  OB History    Para Term  AB Living   6 4 1 3 1 4   SAB IAB Ectopic Multiple Live Births   0 0 0 0 4      # Outcome Date GA Lbr Zachary/2nd Weight Sex Type Anes PTL Lv   6 Current            5  20 36w0d  3.175 kg (7 lb) M Vag-Spont EPI N MALGORZATA      Complications: Cholestasis during pregnancy   4  / 36w0d  3.43 kg (7 lb 9 oz) M Vag-Spont EPI N MALGORZATA      Complications: Cholestasis during pregnancy   3  12 36w0d  3.374 kg (7 lb 7 oz) F Vag-Spont EPI N MALGORZATA      Complications: Cholestasis during pregnancy   2 Term 09 39w0d  2.977 kg (6 lb 9 oz) M Vag-Spont EPI N MALGORZATA      Complications: Cholestasis during pregnancy   1 AB  8w0d    TAB   FD       Past Medical History:  Past Medical History:   Diagnosis Date    Anemia     Liver disease        Past Surgical History:  No past surgical history on file.    Allergies:  Review of  patient's allergies indicates:  No Known Allergies    Social History:  Social History     Tobacco Use    Smoking status: Never     Passive exposure: Never    Smokeless tobacco: Never   Substance Use Topics    Alcohol use: Never    Drug use: Never       Family History:  Family History   Problem Relation Name Age of Onset    No Known Problems Father      No Known Problems Mother         Medications:  Home medications  Facility-Administered Medications Prior to Admission   Medication Dose Route Frequency Provider Last Rate Last Admin    [COMPLETED] Tdap (BOOSTRIX) vaccine injection 0.5 mL  0.5 mL Intramuscular 1 time in Clinic/HOD    0.5 mL at 09/24/24 1018     Medications Prior to Admission   Medication Sig Dispense Refill Last Dose    docusate sodium (COLACE) 100 MG capsule Take 1 capsule (100 mg total) by mouth 2 (two) times daily. 60 capsule 0 Past Week    prenatal vit/iron fum/folic ac (PRENATAL 1+1 ORAL) Take by mouth.   9/23/2024       Current Inpatient medications    Current Facility-Administered Medications:     acetaminophen tablet 650 mg, 650 mg, Oral, Q6H PRN, Iza Knight MD    betamethasone acetate-betamethasone sodium phosphate injection 12 mg, 12 mg, Intramuscular, Q24H, Iza Knight MD    diphenhydrAMINE capsule 25 mg, 25 mg, Oral, Q4H PRN, Iza Knight MD    diphenhydrAMINE injection 25 mg, 25 mg, Intravenous, Q4H PRN, Iza Knight MD    mupirocin 2 % ointment, , Nasal, BID, Iza Knight MD    ondansetron disintegrating tablet 8 mg, 8 mg, Oral, Q8H PRN, Iza Knight MD    prenatal vitamin oral tablet, 1 tablet, Oral, Daily, Iza Knight MD    senna-docusate 8.6-50 mg per tablet 1 tablet, 1 tablet, Oral, Nightly PRN, Iza Knight MD    simethicone chewable tablet 80 mg, 1 tablet, Oral, Q6H PRN, Iza Knight MD    sodium chloride 0.9% flush 10 mL, 10 mL, Intravenous, PRN, Iza Knight MD    ursodioL tablet 500 mg, 500 mg, Oral, BID WM, Iza Knight,  MD    Objective:   There were no vitals filed for this visit.    There is no height or weight on file to calculate BMI.    Physical Exam:  General: Alert and oriented, in no acute distress   Lungs: Clear to auscultation bilaterally   Heart: Regular rate and rhythm   Abdomen: Soft, gravid, non-tender, leopold's 6, non painful erythematous rash on lower abdomen with well demarcated borders   Extremities:  Calves Non tender, non-painful erythematous rash on upper outer bilateral thighs with well demarcated borders.      Edema: None     FHR: 150 bpm   Variability: moderate   Accelerations: Reactive   Decelerations:  Absent   Category:  Category I   Contractions: Irritability      OB Labs:  Blood Type: A POS  Antibody Screen: negative  GBBS: in process   HBsAg: NR  HCVAb: NR  Rubella: immune  Varicella: in process   RPR: NR  HIV: Negative  Hep B: NR  1H GTT: 222    Labs:  Recent Results (from the past 24 hour(s))   POCT URINE DIPSTICK WITHOUT MICROSCOPE    Collection Time: 09/24/24  9:23 AM   Result Value Ref Range    Glucose, UA negative     Bilirubin, POC negative     Ketones, UA negative     Spec Grav UA 1.025     Blood, UA negative     pH, UA 6.0     Protein, POC negative     Urobilinogen, UA 0.2     Nitrite, UA negative     WBC, UA negative     Color, UA Yellow     Clarity, UA Clear    Glucose Tolerance 1 Hour    Collection Time: 09/24/24 10:54 AM   Result Value Ref Range    Glucose 1 Hour 222 (H) 100 - 180 mg/dL   CBC with Differential    Collection Time: 09/24/24 10:54 AM   Result Value Ref Range    WBC 6.81 4.50 - 11.50 x10(3)/mcL    RBC 3.90 (L) 4.20 - 5.40 x10(6)/mcL    Hgb 11.1 (L) 12.0 - 16.0 g/dL    Hct 34.2 (L) 37.0 - 47.0 %    MCV 87.7 80.0 - 94.0 fL    MCH 28.5 27.0 - 31.0 pg    MCHC 32.5 (L) 33.0 - 36.0 g/dL    RDW 14.2 11.5 - 17.0 %    Platelet 191 130 - 400 x10(3)/mcL    MPV 11.3 (H) 7.4 - 10.4 fL    Neut % 67.5 %    Lymph % 22.9 %    Mono % 5.7 %    Eos % 2.1 %    Basophil % 0.3 %    Lymph # 1.56 0.6  - 4.6 x10(3)/mcL    Neut # 4.60 2.1 - 9.2 x10(3)/mcL    Mono # 0.39 0.1 - 1.3 x10(3)/mcL    Eos # 0.14 0 - 0.9 x10(3)/mcL    Baso # 0.02 <=0.2 x10(3)/mcL    IG# 0.10 (H) 0 - 0.04 x10(3)/mcL    IG% 1.5 %    NRBC% 0.0 %     Recent Labs   Lab 24  1054   WBC 6.81   HGB 11.1*   HCT 34.2*      MCV 87.7   RDW 14.2       Assessment/Plan:      Camilla Cruz is a 39 y.o.  @ 36w3d being admitted to the antepartum service for workup of ICP.    Her current obstetrical history is significant for:  Suspected ICP this pregnancy  H/o ICP in 4 prior pregnancies resulting in delivery at 36w0d x 3  Late PNC  Newly Diagnosed GHTN based on 1H GTT of 222 on admit    Admit to antepartum. Admit Labs ordered.  Fetal Monitoring: Continuous    Suspected ICP  -Bile acids and CMP ordered. MFM consulted, appreciate recs.  -start Ursodiol 500 mg BID    GDM  -1H GTT of 222 today meeting criteria for GDM  -2200 kcal diabetic diet ordered.  -CBG's ordered fasting and 2 hour postprandial. Sliding scale corrective insulin ordered.  -MFM consulted, appreciate recs    Patient and plan were discussed with Dr. Peña.    Iza Knight MD, PGY-2  [facility]

## 2024-09-24 NOTE — PROGRESS NOTES
[facility] OB OFFICE VISIT NOTE  Camilla Cruz  [id number]  2024    Chief Complaint: Initial Prenatal Visit (INOB 36w3d, based on LMP. C/O generalized, pruritic rash and insomnia. Questions re: delivery timing.)      Camilla Cruz is a 39 y.o. female   36w3d STEPHAN 10/19/2024, by Last Menstrual Period presenting to [facility] for INOB visit.    Current Issues: Diffuse, generalized itching ongoing x 1.5 months. Has tried cortisol cream and benadryl but not had significant improvement. Denies any other complaints at this time. Has had similar itching in previous pregnancies. States at that time was given medicine by mouth but did not provide any significant relief for itching.      Chronic Issues:   Cholestasis in previous pregnancies: Previous prenatal care with Dr. Dominguez, also saw MFM but does not remember the name of the provider. In the past was induced at 36 weeks for cholestasis of pregnancy.       Gestational History:  (date, GA, length labor, BW, sex, type, anesthesia, place, complications)  OB History    Para Term  AB Living   6 4 1 3 1 4   SAB IAB Ectopic Multiple Live Births   0 0 0 0 4      # Outcome Date GA Lbr Zachary/2nd Weight Sex Type Anes PTL Lv   6 Current            5  20 36w0d  3.175 kg (7 lb) M Vag-Spont EPI N MALGORZATA      Complications: Cholestasis during pregnancy   4  18 36w0d  3.43 kg (7 lb 9 oz) M Vag-Spont EPI N MALGORZATA      Complications: Cholestasis during pregnancy   3  12 36w0d  3.374 kg (7 lb 7 oz) F Vag-Spont EPI N MALGORZATA      Complications: Cholestasis during pregnancy   2 Term 09 39w0d  2.977 kg (6 lb 9 oz) M Vag-Spont EPI N MALGORZATA      Complications: Cholestasis during pregnancy   1 AB  8w0d    TAB   FD     Gyn History:   - LMP: 2024  - Age at menarche: 14 years  - Menstrual hx: regular, 28 day cycles, 5 pads/day, 3-5 days per period  - History of birth control: OCPs  - History of STDs and/or Abnormal PAPs:  "No  - History of prior : No    Past Medical History: Cholestasis in previous pregnancy  Surgical History: No  Family History: No  Social History: Lives in trailer with 4 children; Not currently working; No tobacco use, no EtOH use, no illicit drug use  Medications: Medicine for anemia (unable to provide specific name)  PCP: No    Review of Systems  Antepartum specific ROS  - Fetal movements: Yes  - Vaginal bleeding: No  - Vaginal discharge: No  - Loss of fluid: No  - Contractions: No  - Headaches: No  - Vision changes: No  - Edema: A little bit, improves with elevation of legs    Blood pressure 110/72, pulse 70, temperature 97.7 °F (36.5 °C), temperature source Oral, resp. rate 16, height 5' 1" (1.549 m), weight 82.9 kg (182 lb 12.8 oz), last menstrual period 2024, SpO2 98%.   Physical Exam  Physical Exam  General: in no acute distress  RESP: clear to auscultation bilaterally, non labored  CV: regular rate and rhythm, no murmurs, no edema  ABD: gravid, nontender, BS+  : No visible lesions along labia majora/minora; Vaginal vault pink with no visible lesions; Cervical os closed, non friable, no visible lesions, no CMT  Skin: Visible excoriation marks along bilateral UE and LE; Red, raised, well circumscribed rash present along abdomen and L thigh  FHT: 136 bpm by Doppler  Fundal Height: 37 cm    Current Medications:   Current Outpatient Medications on File Prior to Visit   Medication Sig Dispense Refill    docusate sodium (COLACE) 100 MG capsule Take 1 capsule (100 mg total) by mouth 2 (two) times daily. 60 capsule 0    prenatal vit/iron fum/folic ac (PRENATAL 1+1 ORAL) Take by mouth.       No current facility-administered medications on file prior to visit.       Labs:  Urine dipstick:   Glucose, UA negative   Bilirubin, POC negative   Ketones, UA negative   Spec Grav UA 1.025   Blood, UA negative   pH, UA 6.0   Protein, POC negative   Urobilinogen, UA 0.2   Nitrite, UA negative   WBC, UA negative "   Color, UA Yellow   Clarity, UA Clear     Initial OB Labs Ordered 2024  - Blood Type and Rh: A+   - Antibody Screen: Neg  - CBC H/H: 11.2/34.9  - HIV: NR  - RPR: NR  - GC: Neg  - CT: Neg  - HBsAg: NR  - HCVAb: NR  - Rubella: Positive  - Varicella:   - UA & Culture: Neg   - Sickle Cell Screen:   - PAP:   - Influenza vaccine date: N/A  - BTL desired: N/A    15-20 Weeks Lab Late to establish prenatal care  - Quad Screen:     28 Week Lab Late to establish prenatal care  - 1H GTT: 222- diagnostic of GDM  - Rhogam: N/A  - Date of Tdap: 2024  - CBC H/H: 11.1/34.2  - RPR: NR  - BTL consent: N/A    37 Week Lab Ordered 2024  - CBC H/H: 11.1/34.2  - RPR: NR  - GBS Culture:   - HIV: NR  - Cervical GC: Neg    Imaging:   Initial US 2024: Single intrauterine gestation with average ultrasound age 31 weeks 6 days. Biophysical profile .     Anatomy Scan:    Assessment:   1. 36 weeks gestation of pregnancy    2. Cholestasis during pregnancy in third trimester    Plan:  - Prior : No  - Continue to attend all scheduled OB visits  - ED precautions for any RUQ pain, leakage of fluids, or severe headache  - Should the patient need to visit the ED go to the OB ED at [facility]  - PNVs  - Urine dip reviewed as above  - Indicated labs: PENDING  - Mother plans to breast and bottle feed  - Postpartum contraception discussion: Nexplanon  - Labor precautions discussed in depth  - FM Continuity patient after delivery: No    Cholestasis during pregnancy in 3rd trimester  - Patient previously induced at 36 weeks for cholestasis  - At this time will admit patient to OB service for steroids and delivery  - Patient expressed understanding and will report to [facility]     No scheduled follow up, return for postpartum care    Orders Placed This Encounter   Procedures    Urine Culture High Risk    Strep Only Culture    Chlamydia/GC, PCR    Sickle Cell Screen    CBC Auto Differential    Varicella Zoster Antibody, IgG    Urinalysis     Glucose Tolerance 1 Hour    SYPHILIS ANTIBODY (WITH REFLEX RPR)    HIV 1/2 Ag/Ab (4th Gen)    POCT URINE DIPSTICK WITHOUT MICROSCOPE         Mack Kruse MD  [facility]

## 2024-09-25 LAB
ALBUMIN SERPL-MCNC: 2.5 G/DL (ref 3.5–5)
ALBUMIN/GLOB SERPL: 0.7 RATIO (ref 1.1–2)
ALP SERPL-CCNC: 237 UNIT/L (ref 40–150)
ALT SERPL-CCNC: 141 UNIT/L (ref 0–55)
ANION GAP SERPL CALC-SCNC: 11 MEQ/L
AST SERPL-CCNC: 58 UNIT/L (ref 5–34)
BILIRUB SERPL-MCNC: 0.4 MG/DL
BUN SERPL-MCNC: 10.5 MG/DL (ref 7–18.7)
CALCIUM SERPL-MCNC: 8.4 MG/DL (ref 8.4–10.2)
CHLORIDE SERPL-SCNC: 111 MMOL/L (ref 98–107)
CO2 SERPL-SCNC: 17 MMOL/L (ref 22–29)
CREAT SERPL-MCNC: 0.58 MG/DL (ref 0.55–1.02)
CREAT/UREA NIT SERPL: 18
GFR SERPLBLD CREATININE-BSD FMLA CKD-EPI: >60 ML/MIN/1.73/M2
GLOBULIN SER-MCNC: 3.4 GM/DL (ref 2.4–3.5)
GLUCOSE SERPL-MCNC: 111 MG/DL (ref 74–100)
POCT GLUCOSE: 107 MG/DL (ref 70–110)
POCT GLUCOSE: 116 MG/DL (ref 70–110)
POCT GLUCOSE: 150 MG/DL (ref 70–110)
POCT GLUCOSE: 85 MG/DL (ref 70–110)
POCT GLUCOSE: 90 MG/DL (ref 70–110)
POTASSIUM SERPL-SCNC: 3.8 MMOL/L (ref 3.5–5.1)
PROT SERPL-MCNC: 5.9 GM/DL (ref 6.4–8.3)
SODIUM SERPL-SCNC: 139 MMOL/L (ref 136–145)
VZV IGG SER IA-ACNC: 2.3
VZV IGG SER QL IA: POSITIVE

## 2024-09-25 PROCEDURE — 63600175 PHARM REV CODE 636 W HCPCS: Performed by: OBSTETRICS & GYNECOLOGY

## 2024-09-25 PROCEDURE — 11000001 HC ACUTE MED/SURG PRIVATE ROOM

## 2024-09-25 PROCEDURE — 99223 1ST HOSP IP/OBS HIGH 75: CPT | Mod: ,,, | Performed by: OBSTETRICS & GYNECOLOGY

## 2024-09-25 PROCEDURE — 25000003 PHARM REV CODE 250: Performed by: OBSTETRICS & GYNECOLOGY

## 2024-09-25 PROCEDURE — 36415 COLL VENOUS BLD VENIPUNCTURE: CPT | Performed by: OBSTETRICS & GYNECOLOGY

## 2024-09-25 PROCEDURE — 25000003 PHARM REV CODE 250

## 2024-09-25 PROCEDURE — 80053 COMPREHEN METABOLIC PANEL: CPT | Performed by: OBSTETRICS & GYNECOLOGY

## 2024-09-25 RX ORDER — METHYLERGONOVINE MALEATE 0.2 MG/ML
200 INJECTION INTRAVENOUS ONCE AS NEEDED
Status: COMPLETED | OUTPATIENT
Start: 2024-09-25 | End: 2024-09-26

## 2024-09-25 RX ORDER — OXYTOCIN-SODIUM CHLORIDE 0.9% IV SOLN 30 UNIT/500ML 30-0.9/5 UT/ML-%
95 SOLUTION INTRAVENOUS CONTINUOUS PRN
OUTPATIENT
Start: 2024-09-25

## 2024-09-25 RX ORDER — OXYTOCIN-SODIUM CHLORIDE 0.9% IV SOLN 30 UNIT/500ML 30-0.9/5 UT/ML-%
10 SOLUTION INTRAVENOUS ONCE AS NEEDED
OUTPATIENT
Start: 2024-09-25 | End: 2036-02-22

## 2024-09-25 RX ORDER — OXYTOCIN-SODIUM CHLORIDE 0.9% IV SOLN 30 UNIT/500ML 30-0.9/5 UT/ML-%
10 SOLUTION INTRAVENOUS ONCE AS NEEDED
Status: DISCONTINUED | OUTPATIENT
Start: 2024-09-25 | End: 2024-09-26 | Stop reason: SDUPTHER

## 2024-09-25 RX ORDER — ONDANSETRON 4 MG/1
8 TABLET, ORALLY DISINTEGRATING ORAL EVERY 8 HOURS PRN
Status: DISCONTINUED | OUTPATIENT
Start: 2024-09-25 | End: 2024-09-26 | Stop reason: SDUPTHER

## 2024-09-25 RX ORDER — CARBOPROST TROMETHAMINE 250 UG/ML
250 INJECTION, SOLUTION INTRAMUSCULAR
Status: DISCONTINUED | OUTPATIENT
Start: 2024-09-25 | End: 2024-09-26 | Stop reason: SDUPTHER

## 2024-09-25 RX ORDER — LIDOCAINE HYDROCHLORIDE 10 MG/ML
10 INJECTION, SOLUTION INFILTRATION; PERINEURAL ONCE AS NEEDED
Status: DISCONTINUED | OUTPATIENT
Start: 2024-09-25 | End: 2024-09-28 | Stop reason: HOSPADM

## 2024-09-25 RX ORDER — MUPIROCIN 20 MG/G
OINTMENT TOPICAL
OUTPATIENT
Start: 2024-09-25

## 2024-09-25 RX ORDER — SODIUM CHLORIDE, SODIUM LACTATE, POTASSIUM CHLORIDE, CALCIUM CHLORIDE 600; 310; 30; 20 MG/100ML; MG/100ML; MG/100ML; MG/100ML
INJECTION, SOLUTION INTRAVENOUS CONTINUOUS
Status: DISCONTINUED | OUTPATIENT
Start: 2024-09-25 | End: 2024-09-28 | Stop reason: HOSPADM

## 2024-09-25 RX ORDER — SIMETHICONE 80 MG
1 TABLET,CHEWABLE ORAL 4 TIMES DAILY PRN
Status: DISCONTINUED | OUTPATIENT
Start: 2024-09-25 | End: 2024-09-26 | Stop reason: SDUPTHER

## 2024-09-25 RX ORDER — OXYTOCIN-SODIUM CHLORIDE 0.9% IV SOLN 30 UNIT/500ML 30-0.9/5 UT/ML-%
95 SOLUTION INTRAVENOUS ONCE AS NEEDED
Status: DISCONTINUED | OUTPATIENT
Start: 2024-09-25 | End: 2024-09-26 | Stop reason: SDUPTHER

## 2024-09-25 RX ORDER — MISOPROSTOL 100 UG/1
800 TABLET ORAL ONCE AS NEEDED
Status: DISCONTINUED | OUTPATIENT
Start: 2024-09-25 | End: 2024-09-26 | Stop reason: SDUPTHER

## 2024-09-25 RX ORDER — OXYTOCIN 10 [USP'U]/ML
10 INJECTION, SOLUTION INTRAMUSCULAR; INTRAVENOUS ONCE AS NEEDED
Status: DISCONTINUED | OUTPATIENT
Start: 2024-09-25 | End: 2024-09-26 | Stop reason: SDUPTHER

## 2024-09-25 RX ORDER — DIPHENOXYLATE HYDROCHLORIDE AND ATROPINE SULFATE 2.5; .025 MG/1; MG/1
2 TABLET ORAL EVERY 6 HOURS PRN
Status: DISCONTINUED | OUTPATIENT
Start: 2024-09-25 | End: 2024-09-26 | Stop reason: SDUPTHER

## 2024-09-25 RX ORDER — CALCIUM CARBONATE 200(500)MG
500 TABLET,CHEWABLE ORAL 3 TIMES DAILY PRN
Status: DISCONTINUED | OUTPATIENT
Start: 2024-09-25 | End: 2024-09-28 | Stop reason: HOSPADM

## 2024-09-25 RX ADMIN — SODIUM CHLORIDE, POTASSIUM CHLORIDE, SODIUM LACTATE AND CALCIUM CHLORIDE: 600; 310; 30; 20 INJECTION, SOLUTION INTRAVENOUS at 09:09

## 2024-09-25 RX ADMIN — URSODIOL 500 MG: 250 TABLET, FILM COATED ORAL at 07:09

## 2024-09-25 RX ADMIN — DINOPROSTONE 10 MG: 10 INSERT VAGINAL at 09:09

## 2024-09-25 RX ADMIN — URSODIOL 500 MG: 250 TABLET, FILM COATED ORAL at 05:09

## 2024-09-25 RX ADMIN — SODIUM CHLORIDE, POTASSIUM CHLORIDE, SODIUM LACTATE AND CALCIUM CHLORIDE: 600; 310; 30; 20 INJECTION, SOLUTION INTRAVENOUS at 05:09

## 2024-09-25 NOTE — PROGRESS NOTES
Antepartum Progress Note:     Subjective:  Camilla Cruz is a 39 y.o.  @ 36w4d by LMP who admitted to the antepartum service for evaluation of suspected intrahepatic cholestasis of pregnancy. Pregnancy complicated by h/o ICP in 4 prior pregnancies, h/o  delivery x 3 at 36w0d for ICP, GDM, late PNC, and AMA. This morning she repots her itching has improved. Endorses feeling contraction this morning around 0400.    Denies vaginal bleeding, contractions, or LOF. Reports normal fetal movement.  Denies headache, vision changes, or RUQ pain.  Denies fevers, chills, nausea, vomiting, chest pain, or shortness of breath.  Denies lightheadedness, dizziness, or syncope.    Objective:     Vitals:    24 2136 24 2301 24 0226 24 0705   BP:  (!) 98/55 (!) 102/52 (!) 97/52   Pulse: 93 81 81 75   Resp:  18  16   Temp:  98.5 °F (36.9 °C)  97.9 °F (36.6 °C)   TempSrc:  Oral     SpO2: 99%        There is no height or weight on file to calculate BMI.    General: Alert and oriented, in no acute distress.   Lungs: Clear to auscultation bilaterally.   Heart: Regular rate and rhythm.   Abdomen: Gravid, Soft, Non-tender, no rebound to tenderness, no guarding, active bowel sounds. non painful erythematous rash on lower abdomen with well demarcated borders    Edema: Absent   Extremities: :Calves Non tender, non-painful erythematous rash on upper outer bilateral thighs with well demarcated borders.     EFM:  150s-160s Baseline rate, moderate variability, +accels, overnight late and variable decelerations noted with spontaneous return to baseline, Occasional contractions    US :  BPP 8/8, VIVI 6-7 cm, MVP 4cm    Labs:  Recent Results (from the past 24 hour(s))   POCT URINE DIPSTICK WITHOUT MICROSCOPE    Collection Time: 24  9:23 AM   Result Value Ref Range    Glucose, UA negative     Bilirubin, POC negative     Ketones, UA negative     Spec Grav UA 1.025     Blood, UA negative     pH, UA  6.0     Protein, POC negative     Urobilinogen, UA 0.2     Nitrite, UA negative     WBC, UA negative     Color, UA Yellow     Clarity, UA Clear    Urinalysis    Collection Time: 09/24/24 10:09 AM   Result Value Ref Range    Color, UA Light-Yellow Yellow, Light-Yellow, Dark Yellow, Lizbeth, Straw    Appearance, UA Clear Clear    Specific Gravity, UA 1.021 1.005 - 1.030    pH, UA 6.0 5.0 - 8.5    Protein, UA Trace (A) Negative    Glucose, UA Normal Negative, Normal    Ketones, UA Negative Negative    Blood, UA Negative Negative    Bilirubin, UA Negative Negative    Urobilinogen, UA Normal 0.2, 1.0, Normal    Nitrites, UA Negative Negative    Leukocyte Esterase, UA Negative Negative    RBC, UA 0-5 None Seen, 0-2, 3-5, 0-5 /HPF    WBC, UA 0-5 None Seen, 0-2, 3-5, 0-5 /HPF    Bacteria, UA Trace (A) None Seen /HPF    Squamous Epithelial Cells, UA Few (A) None Seen /HPF    Mucous, UA Trace (A) None Seen /LPF    Hyaline Casts, UA None Seen None Seen /lpf    Calcium Oxalate Crystals, UA Occ (A) None Seen   Urine Culture High Risk    Collection Time: 09/24/24 10:09 AM    Specimen: Urine, Clean Catch   Result Value Ref Range    Urine Culture No Growth At 24 Hours    Chlamydia/GC, PCR    Collection Time: 09/24/24 10:09 AM    Specimen: Endocervical; Genital   Result Value Ref Range    Chlamydia trachomatis PCR Not Detected Not Detected    N. gonorrhea PCR Not Detected Not Detected    Source Endocervical    HIV 1/2 Ag/Ab (4th Gen)    Collection Time: 09/24/24 10:54 AM   Result Value Ref Range    HIV Nonreactive Nonreactive   SYPHILIS ANTIBODY (WITH REFLEX RPR)    Collection Time: 09/24/24 10:54 AM   Result Value Ref Range    Syphilis Antibody Nonreactive Nonreactive, Equivocal   Glucose Tolerance 1 Hour    Collection Time: 09/24/24 10:54 AM   Result Value Ref Range    Glucose 1 Hour 222 (H) 100 - 180 mg/dL   CBC with Differential    Collection Time: 09/24/24 10:54 AM   Result Value Ref Range    WBC 6.81 4.50 - 11.50 x10(3)/mcL     RBC 3.90 (L) 4.20 - 5.40 x10(6)/mcL    Hgb 11.1 (L) 12.0 - 16.0 g/dL    Hct 34.2 (L) 37.0 - 47.0 %    MCV 87.7 80.0 - 94.0 fL    MCH 28.5 27.0 - 31.0 pg    MCHC 32.5 (L) 33.0 - 36.0 g/dL    RDW 14.2 11.5 - 17.0 %    Platelet 191 130 - 400 x10(3)/mcL    MPV 11.3 (H) 7.4 - 10.4 fL    Neut % 67.5 %    Lymph % 22.9 %    Mono % 5.7 %    Eos % 2.1 %    Basophil % 0.3 %    Lymph # 1.56 0.6 - 4.6 x10(3)/mcL    Neut # 4.60 2.1 - 9.2 x10(3)/mcL    Mono # 0.39 0.1 - 1.3 x10(3)/mcL    Eos # 0.14 0 - 0.9 x10(3)/mcL    Baso # 0.02 <=0.2 x10(3)/mcL    IG# 0.10 (H) 0 - 0.04 x10(3)/mcL    IG% 1.5 %    NRBC% 0.0 %   Comprehensive Metabolic Panel    Collection Time: 09/24/24  1:26 PM   Result Value Ref Range    Sodium 140 136 - 145 mmol/L    Potassium 3.7 3.5 - 5.1 mmol/L    Chloride 114 (H) 98 - 107 mmol/L    CO2 18 (L) 22 - 29 mmol/L    Glucose 97 74 - 100 mg/dL    Blood Urea Nitrogen 8.0 7.0 - 18.7 mg/dL    Creatinine 0.59 0.55 - 1.02 mg/dL    Calcium 8.4 8.4 - 10.2 mg/dL    Protein Total 6.1 (L) 6.4 - 8.3 gm/dL    Albumin 2.6 (L) 3.5 - 5.0 g/dL    Globulin 3.5 2.4 - 3.5 gm/dL    Albumin/Globulin Ratio 0.7 (L) 1.1 - 2.0 ratio    Bilirubin Total 0.4 <=1.5 mg/dL     (H) 40 - 150 unit/L     (H) 0 - 55 unit/L    AST 46 (H) 5 - 34 unit/L    eGFR >60 mL/min/1.73/m2    Anion Gap 8.0 mEq/L    BUN/Creatinine Ratio 14    Type & Screen    Collection Time: 09/24/24  1:26 PM   Result Value Ref Range    Group & Rh A POS     Indirect Sue GEL NEG     Specimen Outdate 09/27/2024 23:59    Strep Group B by PCR    Collection Time: 09/24/24  1:45 PM   Result Value Ref Range    STREP B PCR ([facility]) GBS Presumptive Not Detected GBS Presumptive Not Detected    STREP B CULTURE Culture Confirmation to Follow Negative   POCT glucose    Collection Time: 09/24/24  7:20 PM   Result Value Ref Range    POCT Glucose 155 (H) 70 - 110 mg/dL   POCT glucose    Collection Time: 09/25/24  4:36 AM   Result Value Ref Range    POCT Glucose 116 (H) 70 - 110  mg/dL   Comprehensive Metabolic Panel    Collection Time: 09/25/24  7:22 AM   Result Value Ref Range    Sodium 139 136 - 145 mmol/L    Potassium 3.8 3.5 - 5.1 mmol/L    Chloride 111 (H) 98 - 107 mmol/L    CO2 17 (L) 22 - 29 mmol/L    Glucose 111 (H) 74 - 100 mg/dL    Blood Urea Nitrogen 10.5 7.0 - 18.7 mg/dL    Creatinine 0.58 0.55 - 1.02 mg/dL    Calcium 8.4 8.4 - 10.2 mg/dL    Protein Total 5.9 (L) 6.4 - 8.3 gm/dL    Albumin 2.5 (L) 3.5 - 5.0 g/dL    Globulin 3.4 2.4 - 3.5 gm/dL    Albumin/Globulin Ratio 0.7 (L) 1.1 - 2.0 ratio    Bilirubin Total 0.4 <=1.5 mg/dL     (H) 40 - 150 unit/L     (H) 0 - 55 unit/L    AST 58 (H) 5 - 34 unit/L    eGFR >60 mL/min/1.73/m2    Anion Gap 11.0 mEq/L    BUN/Creatinine Ratio 18        Recent Labs   Lab 09/24/24  1054 09/24/24  1326 09/25/24  0722   WBC 6.81  --   --    HGB 11.1*  --   --    HCT 34.2*  --   --      --   --    MCV 87.7  --   --    RDW 14.2  --   --    NA  --  140 139   K  --  3.7 3.8   CL  --  114* 111*   CO2  --  18* 17*   BUN  --  8.0 10.5   CREATININE  --  0.59 0.58   CALCIUM  --  8.4 8.4   ALBUMIN  --  2.6* 2.5*   BILITOT  --  0.4 0.4   AST  --  46* 58*   ALT  --  129* 141*   ALKPHOS  --  253* 237*       Medications:      mupirocin   Nasal BID    prenatal vitamin  1 tablet Oral Daily    ursodioL  500 mg Oral BID WM           Current Facility-Administered Medications:     acetaminophen, 650 mg, Oral, Q6H PRN    dextrose 10%, 12.5 g, Intravenous, PRN    dextrose 10%, 25 g, Intravenous, PRN    diphenhydrAMINE, 25 mg, Oral, Q4H PRN    diphenhydrAMINE, 25 mg, Intravenous, Q4H PRN    glucagon (human recombinant), 1 mg, Intramuscular, PRN    glucose, 16 g, Oral, PRN    glucose, 24 g, Oral, PRN    insulin aspart U-100, 0-10 Units, Subcutaneous, QID (AC + HS) PRN    ondansetron, 8 mg, Oral, Q8H PRN    senna-docusate 8.6-50 mg, 1 tablet, Oral, Nightly PRN    simethicone, 1 tablet, Oral, Q6H PRN    sodium chloride 0.9%, 10 mL, Intravenous,  PRN    Assessment/Plan:  Camilla Cruz is a 39 y.o.  @ 36w4d admitted to the antepartum service for workup of ICP.     Her current obstetrical history is significant for:  Suspected ICP this pregnancy  H/o ICP in 4 prior pregnancies resulting in delivery at 36w0d x 3  Late PNC  GDM      Suspected ICP  -Bile acids and CMP ordered. [facility] consulted, appreciate recs.  -CMP with AST//46, elevated from one month ago where they were 16/15  -Bile acids in process, will follow up  -continue Ursodiol 500 mg BID  -per M recs will proceed with delivery at this time     GDM  -1H GTT of 222 on  meeting criteria for GDM  -2200 kcal diabetic diet ordered.  -CBG's ordered fasting and 2 hour postprandial. Sliding scale corrective insulin ordered.  -M consulted, appreciate recs    Will discuss with Dr. Peña.    Iza Knight MD, PGY-2  [facility]

## 2024-09-25 NOTE — CONSULTS
Consultation Note  Maternal Fetal Medicine          Subjective:         Camilla Cruz is a 39 y.o.  female with IUP at 36w4d consistent with third trimester US with significant history of cholestasis in pregnancy who is admitted for concern for cholestasis.     Patient was sent from her OB office yesterday to the hospital due to reports of itching close to term with a significant history of cholestasis in for prior pregnancies.  She additionally is complicated by new diagnosis of gestational diabetes (yesterday), late prenatal care and advanced maternal age.  She states that her itching is significant and she has plaques on her legs and abdomen as well as other areas of the body.    Since admission, her liver function testing has been abnormal consistent with likely cholestasis.  Bile acids are pending.  Unfortunately, fetal heart rate monitoring shows concern for frequent late and variable decelerations with good variability remaining.      PMHx:   Past Medical History:   Diagnosis Date    Anemia     Liver disease        PSHx: History reviewed. No pertinent surgical history.    All: Review of patient's allergies indicates:  No Known Allergies    Meds:   Facility-Administered Medications Prior to Admission   Medication Dose Route Frequency Provider Last Rate Last Admin    [COMPLETED] Tdap (BOOSTRIX) vaccine injection 0.5 mL  0.5 mL Intramuscular 1 time in Clinic/HOD    0.5 mL at 24 1018     Medications Prior to Admission   Medication Sig Dispense Refill Last Dose    docusate sodium (COLACE) 100 MG capsule Take 1 capsule (100 mg total) by mouth 2 (two) times daily. 60 capsule 0 Past Week    prenatal vit/iron fum/folic ac (PRENATAL 1+1 ORAL) Take by mouth.   2024       SH:   Social History     Socioeconomic History    Marital status: Single   Tobacco Use    Smoking status: Never     Passive exposure: Never    Smokeless tobacco: Never   Substance and Sexual Activity    Alcohol use: Never     Drug use: Never    Sexual activity: Not Currently     Social Determinants of Health     Financial Resource Strain: Low Risk  (2024)    Overall Financial Resource Strain (CARDIA)     Difficulty of Paying Living Expenses: Not hard at all   Food Insecurity: No Food Insecurity (2024)    Hunger Vital Sign     Worried About Running Out of Food in the Last Year: Never true     Ran Out of Food in the Last Year: Never true   Transportation Needs: No Transportation Needs (2024)    TRANSPORTATION NEEDS     Transportation : No   Physical Activity: Sufficiently Active (2024)    Exercise Vital Sign     Days of Exercise per Week: 6 days     Minutes of Exercise per Session: 30 min   Stress: Stress Concern Present (2024)    [employer] [place] [employer] - Occupational Stress Questionnaire     Feeling of Stress : To some extent   Housing Stability: Low Risk  (2024)    Housing Stability Vital Sign     Unable to Pay for Housing in the Last Year: No     Homeless in the Last Year: No       FH:   Family History   Problem Relation Name Age of Onset    No Known Problems Father      No Known Problems Mother         OBHx:   OB History    Para Term  AB Living   6 4 1 3 1 4   SAB IAB Ectopic Multiple Live Births   0 0 0 0 4      # Outcome Date GA Lbr Zachary/2nd Weight Sex Type Anes PTL Lv   6 Current            5  20 36w0d  3.175 kg (7 lb) M Vag-Spont EPI N MALGORZATA      Complications: Cholestasis during pregnancy   4  18 36w0d  3.43 kg (7 lb 9 oz) M Vag-Spont EPI N MALGORZATA      Complications: Cholestasis during pregnancy   3  12 36w0d  3.374 kg (7 lb 7 oz) F Vag-Spont EPI N MALGORZATA      Complications: Cholestasis during pregnancy   2 Term 09 39w0d  2.977 kg (6 lb 9 oz) M Vag-Spont EPI N MALGORZATA      Complications: Cholestasis during pregnancy   1 AB  8w0d    TAB   FD       Objective:         Temp:  [97.9 °F (36.6 °C)-98.5 °F (36.9 °C)] 97.9 °F (36.6 °C)  Pulse:   [] 81  Resp:  [16-18] 16  SpO2:  [98 %-100 %] 99 %  BP: ()/(52-69) 116/67    Gen: NAD, A&Ox3  Pulm: Unlabored breathing, LCTAB  Card: RRR  Abd: FHT present, soft, nondistended, nontender to palpation, gravid uterus palpable c/w gestational age  Extremities: Palpable peripheral pulses, no pedal edema,  DTRs x 4, rash on thighs, abdomen, shoulder and more. It appears like pink plaques and is slightly raised and dry.     NST: 140 baseline, moderate BTBV, pos accelerations, late decelerations noted  Star: Q4-6  US: vtx, anterior placenta, VIVI 7, BPP 8/8    Lab Review  Blood Type: A POS  GBBS: neg (rapid)  Rubella: Immune  RPR: NR  HIV: negative  HepB: negative    Recent Results (from the past 24 hour(s))   Comprehensive Metabolic Panel    Collection Time: 09/24/24  1:26 PM   Result Value Ref Range    Sodium 140 136 - 145 mmol/L    Potassium 3.7 3.5 - 5.1 mmol/L    Chloride 114 (H) 98 - 107 mmol/L    CO2 18 (L) 22 - 29 mmol/L    Glucose 97 74 - 100 mg/dL    Blood Urea Nitrogen 8.0 7.0 - 18.7 mg/dL    Creatinine 0.59 0.55 - 1.02 mg/dL    Calcium 8.4 8.4 - 10.2 mg/dL    Protein Total 6.1 (L) 6.4 - 8.3 gm/dL    Albumin 2.6 (L) 3.5 - 5.0 g/dL    Globulin 3.5 2.4 - 3.5 gm/dL    Albumin/Globulin Ratio 0.7 (L) 1.1 - 2.0 ratio    Bilirubin Total 0.4 <=1.5 mg/dL     (H) 40 - 150 unit/L     (H) 0 - 55 unit/L    AST 46 (H) 5 - 34 unit/L    eGFR >60 mL/min/1.73/m2    Anion Gap 8.0 mEq/L    BUN/Creatinine Ratio 14    Type & Screen    Collection Time: 09/24/24  1:26 PM   Result Value Ref Range    Group & Rh A POS     Indirect Sue GEL NEG     Specimen Outdate 09/27/2024 23:59    Strep Group B by PCR    Collection Time: 09/24/24  1:45 PM   Result Value Ref Range    STREP B PCR ([facility]) GBS Presumptive Not Detected GBS Presumptive Not Detected    STREP B CULTURE Negative Negative   POCT glucose    Collection Time: 09/24/24  7:20 PM   Result Value Ref Range    POCT Glucose 155 (H) 70 - 110 mg/dL   POCT glucose     Collection Time: 24  4:36 AM   Result Value Ref Range    POCT Glucose 116 (H) 70 - 110 mg/dL   Comprehensive Metabolic Panel    Collection Time: 24  7:22 AM   Result Value Ref Range    Sodium 139 136 - 145 mmol/L    Potassium 3.8 3.5 - 5.1 mmol/L    Chloride 111 (H) 98 - 107 mmol/L    CO2 17 (L) 22 - 29 mmol/L    Glucose 111 (H) 74 - 100 mg/dL    Blood Urea Nitrogen 10.5 7.0 - 18.7 mg/dL    Creatinine 0.58 0.55 - 1.02 mg/dL    Calcium 8.4 8.4 - 10.2 mg/dL    Protein Total 5.9 (L) 6.4 - 8.3 gm/dL    Albumin 2.5 (L) 3.5 - 5.0 g/dL    Globulin 3.4 2.4 - 3.5 gm/dL    Albumin/Globulin Ratio 0.7 (L) 1.1 - 2.0 ratio    Bilirubin Total 0.4 <=1.5 mg/dL     (H) 40 - 150 unit/L     (H) 0 - 55 unit/L    AST 58 (H) 5 - 34 unit/L    eGFR >60 mL/min/1.73/m2    Anion Gap 11.0 mEq/L    BUN/Creatinine Ratio 18    POCT glucose    Collection Time: 24  9:27 AM   Result Value Ref Range    POCT Glucose 150 (H) 70 - 110 mg/dL       Assessment:       39 y.o.  at 36w4d weeks gestation admitted for concern for cholestasis.    There are no hospital problems to display for this patient.       Plan:     1. Intrahepatic cholestasis of pregnancy  - the patient has a strong history of cholestasis of pregnancy.  She reports itching at this time.  Unfortunately, she is newly presenting to care and is almost full term.  Admission was recommended for evaluation.  - liver function testing is abnormal, trending up from admission.  Likely associated with cholestasis.  Bile acids are pending.  - ursodiol has been started.  Okay to continue until delivery.  - supportive care with Benadryl creams and p.o. dosing as needed for itching  - her rash is likely related to cholestasis and will resolve postpartum  - given the increased risk of stillbirth associated with cholestasis and abnormal liver function testing, I recommend proceeding with delivery at this time.  - she received 1 dose of steroids yesterday however due  to her gestational diabetes this was not continued  - please ensure that liver function testing is improving prior to discharge after delivery    2. Fetal decelerations  - overall category 2 tracing  -proceed with induction of labor.  I recommend starting with Pitocin for contraction stress test as this baby may not tolerate labor    3.  Gestational DM  - new diagnosis, no medications   -discontinue steroid course due to this diagnosis   -sliding scale insulin can be ordered if needed.  Fingersticks in labor        Thank you for allowing us to participate in the care of your patient. If you have any questions/concerns, please do not hesitate to contact us.     Swathi Grimaldo  Maternal Fetal Medicine

## 2024-09-25 NOTE — PROGRESS NOTES
Overnight patient had several late decelerations always preceded by reactive strip and proceeded by a reactive strip.  Occasional contractions.  Due to these findings biophysical profile was ordered.  BPP results notes 8/8 with VIVI of 6.6 cm.  Vertex presentation    Vitals:    09/25/24 0226   BP: (!) 102/52   Pulse: 81   Resp:    Temp:         Cervical exam notes 1 cm dilated/50% effacement/-3 station the cervix is midposition and soft.    Patient still having complaints of itching and working diagnosis is cholestasis of pregnancy.    We will discuss findings with team in 30 minutes when they arrived for their shift.  Currently category 1 tracing with a BPP of 8/8.    Continue external fetal monitoring and following MFM recommendations.

## 2024-09-26 ENCOUNTER — ANESTHESIA (OUTPATIENT)
Dept: OBSTETRICS AND GYNECOLOGY | Facility: HOSPITAL | Age: 39
End: 2024-09-26
Payer: MEDICAID

## 2024-09-26 ENCOUNTER — ANESTHESIA EVENT (OUTPATIENT)
Dept: OBSTETRICS AND GYNECOLOGY | Facility: HOSPITAL | Age: 39
End: 2024-09-26
Payer: MEDICAID

## 2024-09-26 LAB
ALBUMIN SERPL-MCNC: 2.6 G/DL (ref 3.5–5)
ALBUMIN/GLOB SERPL: 0.8 RATIO (ref 1.1–2)
ALP SERPL-CCNC: 240 UNIT/L (ref 40–150)
ALT SERPL-CCNC: 183 UNIT/L (ref 0–55)
ANION GAP SERPL CALC-SCNC: 11 MEQ/L
AST SERPL-CCNC: 90 UNIT/L (ref 5–34)
BACTERIA UR CULT: NORMAL
BASOPHILS # BLD AUTO: 0.02 X10(3)/MCL
BASOPHILS NFR BLD AUTO: 0.2 %
BILE AC SERPL-SCNC: 5 MCMOL/L
BILIRUB SERPL-MCNC: 0.5 MG/DL
BUN SERPL-MCNC: 11.3 MG/DL (ref 7–18.7)
CALCIUM SERPL-MCNC: 8.7 MG/DL (ref 8.4–10.2)
CHLORIDE SERPL-SCNC: 110 MMOL/L (ref 98–107)
CO2 SERPL-SCNC: 15 MMOL/L (ref 22–29)
CREAT SERPL-MCNC: 0.55 MG/DL (ref 0.55–1.02)
CREAT/UREA NIT SERPL: 21
EOSINOPHIL # BLD AUTO: 0.02 X10(3)/MCL (ref 0–0.9)
EOSINOPHIL NFR BLD AUTO: 0.2 %
ERYTHROCYTE [DISTWIDTH] IN BLOOD BY AUTOMATED COUNT: 14.5 % (ref 11.5–17)
GFR SERPLBLD CREATININE-BSD FMLA CKD-EPI: >60 ML/MIN/1.73/M2
GLOBULIN SER-MCNC: 3.4 GM/DL (ref 2.4–3.5)
GLUCOSE SERPL-MCNC: 84 MG/DL (ref 74–100)
HCT VFR BLD AUTO: 31.6 % (ref 37–47)
HGB BLD-MCNC: 10.2 G/DL (ref 12–16)
HGB S BLD QL SOLY: NEGATIVE
IMM GRANULOCYTES # BLD AUTO: 0.05 X10(3)/MCL (ref 0–0.04)
IMM GRANULOCYTES NFR BLD AUTO: 0.6 %
LYMPHOCYTES # BLD AUTO: 1.08 X10(3)/MCL (ref 0.6–4.6)
LYMPHOCYTES NFR BLD AUTO: 12.1 %
MCH RBC QN AUTO: 28.7 PG (ref 27–31)
MCHC RBC AUTO-ENTMCNC: 32.3 G/DL (ref 33–36)
MCV RBC AUTO: 89 FL (ref 80–94)
MONOCYTES # BLD AUTO: 0.59 X10(3)/MCL (ref 0.1–1.3)
MONOCYTES NFR BLD AUTO: 6.6 %
NEUTROPHILS # BLD AUTO: 7.13 X10(3)/MCL (ref 2.1–9.2)
NEUTROPHILS NFR BLD AUTO: 80.3 %
NRBC BLD AUTO-RTO: 0 %
PLATELET # BLD AUTO: 148 X10(3)/MCL (ref 130–400)
PMV BLD AUTO: 10.8 FL (ref 7.4–10.4)
POCT GLUCOSE: 75 MG/DL (ref 70–110)
POCT GLUCOSE: 84 MG/DL (ref 70–110)
POTASSIUM SERPL-SCNC: 3.8 MMOL/L (ref 3.5–5.1)
PROT SERPL-MCNC: 6 GM/DL (ref 6.4–8.3)
RBC # BLD AUTO: 3.55 X10(6)/MCL (ref 4.2–5.4)
SODIUM SERPL-SCNC: 136 MMOL/L (ref 136–145)
STREP B CULTURE (OHS): NEGATIVE
STREP B PCR (OHS): NORMAL
WBC # BLD AUTO: 8.89 X10(3)/MCL (ref 4.5–11.5)

## 2024-09-26 PROCEDURE — 25000003 PHARM REV CODE 250

## 2024-09-26 PROCEDURE — 3E0P7VZ INTRODUCTION OF HORMONE INTO FEMALE REPRODUCTIVE, VIA NATURAL OR ARTIFICIAL OPENING: ICD-10-PCS | Performed by: OBSTETRICS & GYNECOLOGY

## 2024-09-26 PROCEDURE — 25000003 PHARM REV CODE 250: Performed by: ANESTHESIOLOGY

## 2024-09-26 PROCEDURE — 11000001 HC ACUTE MED/SURG PRIVATE ROOM

## 2024-09-26 PROCEDURE — 63600175 PHARM REV CODE 636 W HCPCS

## 2024-09-26 PROCEDURE — 85025 COMPLETE CBC W/AUTO DIFF WBC: CPT | Performed by: OBSTETRICS & GYNECOLOGY

## 2024-09-26 PROCEDURE — 36415 COLL VENOUS BLD VENIPUNCTURE: CPT | Performed by: OBSTETRICS & GYNECOLOGY

## 2024-09-26 PROCEDURE — 63600175 PHARM REV CODE 636 W HCPCS: Mod: JZ,JG | Performed by: ANESTHESIOLOGY

## 2024-09-26 PROCEDURE — 80053 COMPREHEN METABOLIC PANEL: CPT | Performed by: OBSTETRICS & GYNECOLOGY

## 2024-09-26 PROCEDURE — 25000003 PHARM REV CODE 250: Performed by: OBSTETRICS & GYNECOLOGY

## 2024-09-26 PROCEDURE — 10907ZC DRAINAGE OF AMNIOTIC FLUID, THERAPEUTIC FROM PRODUCTS OF CONCEPTION, VIA NATURAL OR ARTIFICIAL OPENING: ICD-10-PCS | Performed by: OBSTETRICS & GYNECOLOGY

## 2024-09-26 PROCEDURE — 51702 INSERT TEMP BLADDER CATH: CPT

## 2024-09-26 PROCEDURE — 72200006 HC VAGINAL DELIVERY LEVEL III

## 2024-09-26 PROCEDURE — 72200005 HC VAGINAL DELIVERY LEVEL II

## 2024-09-26 PROCEDURE — 72100003 HC LABOR CARE, EA. ADDL. 8 HRS

## 2024-09-26 PROCEDURE — 0HQ9XZZ REPAIR PERINEUM SKIN, EXTERNAL APPROACH: ICD-10-PCS | Performed by: OBSTETRICS & GYNECOLOGY

## 2024-09-26 PROCEDURE — 72100002 HC LABOR CARE, 1ST 8 HOURS

## 2024-09-26 PROCEDURE — 3E0DXGC INTRODUCTION OF OTHER THERAPEUTIC SUBSTANCE INTO MOUTH AND PHARYNX, EXTERNAL APPROACH: ICD-10-PCS | Performed by: OBSTETRICS & GYNECOLOGY

## 2024-09-26 PROCEDURE — 62326 NJX INTERLAMINAR LMBR/SAC: CPT | Performed by: ANESTHESIOLOGY

## 2024-09-26 PROCEDURE — 63600175 PHARM REV CODE 636 W HCPCS: Performed by: OBSTETRICS & GYNECOLOGY

## 2024-09-26 RX ORDER — SODIUM CHLORIDE 0.9 % (FLUSH) 0.9 %
10 SYRINGE (ML) INJECTION
Status: DISCONTINUED | OUTPATIENT
Start: 2024-09-26 | End: 2024-09-26 | Stop reason: SDUPTHER

## 2024-09-26 RX ORDER — DIPHENHYDRAMINE HYDROCHLORIDE 50 MG/ML
25 INJECTION INTRAMUSCULAR; INTRAVENOUS EVERY 4 HOURS PRN
Status: DISCONTINUED | OUTPATIENT
Start: 2024-09-26 | End: 2024-09-28 | Stop reason: HOSPADM

## 2024-09-26 RX ORDER — DIPHENHYDRAMINE HYDROCHLORIDE 50 MG/ML
25 INJECTION INTRAMUSCULAR; INTRAVENOUS EVERY 4 HOURS PRN
OUTPATIENT
Start: 2024-09-26

## 2024-09-26 RX ORDER — SIMETHICONE 80 MG
1 TABLET,CHEWABLE ORAL EVERY 6 HOURS PRN
Status: DISCONTINUED | OUTPATIENT
Start: 2024-09-26 | End: 2024-09-28 | Stop reason: HOSPADM

## 2024-09-26 RX ORDER — OXYTOCIN-SODIUM CHLORIDE 0.9% IV SOLN 30 UNIT/500ML 30-0.9/5 UT/ML-%
95 SOLUTION INTRAVENOUS CONTINUOUS PRN
OUTPATIENT
Start: 2024-09-26

## 2024-09-26 RX ORDER — HYDROCORTISONE 25 MG/G
CREAM TOPICAL 3 TIMES DAILY PRN
Status: DISCONTINUED | OUTPATIENT
Start: 2024-09-26 | End: 2024-09-28 | Stop reason: HOSPADM

## 2024-09-26 RX ORDER — SODIUM CITRATE AND CITRIC ACID MONOHYDRATE 334; 500 MG/5ML; MG/5ML
30 SOLUTION ORAL ONCE
Status: DISCONTINUED | OUTPATIENT
Start: 2024-09-26 | End: 2024-09-28 | Stop reason: HOSPADM

## 2024-09-26 RX ORDER — CARBOPROST TROMETHAMINE 250 UG/ML
250 INJECTION, SOLUTION INTRAMUSCULAR
Status: DISCONTINUED | OUTPATIENT
Start: 2024-09-26 | End: 2024-09-28 | Stop reason: HOSPADM

## 2024-09-26 RX ORDER — SIMETHICONE 80 MG
1 TABLET,CHEWABLE ORAL EVERY 6 HOURS PRN
OUTPATIENT
Start: 2024-09-26

## 2024-09-26 RX ORDER — MISOPROSTOL 100 UG/1
TABLET ORAL
Status: DISPENSED
Start: 2024-09-26 | End: 2024-09-27

## 2024-09-26 RX ORDER — OXYTOCIN-SODIUM CHLORIDE 0.9% IV SOLN 30 UNIT/500ML 30-0.9/5 UT/ML-%
10 SOLUTION INTRAVENOUS ONCE AS NEEDED
Status: DISCONTINUED | OUTPATIENT
Start: 2024-09-26 | End: 2024-09-26 | Stop reason: SDUPTHER

## 2024-09-26 RX ORDER — ONDANSETRON 4 MG/1
8 TABLET, ORALLY DISINTEGRATING ORAL EVERY 8 HOURS PRN
Status: DISCONTINUED | OUTPATIENT
Start: 2024-09-26 | End: 2024-09-28 | Stop reason: HOSPADM

## 2024-09-26 RX ORDER — HYDROCORTISONE 25 MG/G
CREAM TOPICAL 3 TIMES DAILY PRN
OUTPATIENT
Start: 2024-09-26

## 2024-09-26 RX ORDER — DIPHENHYDRAMINE HYDROCHLORIDE 50 MG/ML
12.5 INJECTION INTRAMUSCULAR; INTRAVENOUS EVERY 4 HOURS PRN
Status: DISCONTINUED | OUTPATIENT
Start: 2024-09-26 | End: 2024-09-28 | Stop reason: HOSPADM

## 2024-09-26 RX ORDER — ONDANSETRON HYDROCHLORIDE 2 MG/ML
4 INJECTION, SOLUTION INTRAVENOUS ONCE AS NEEDED
Status: DISCONTINUED | OUTPATIENT
Start: 2024-09-26 | End: 2024-09-26

## 2024-09-26 RX ORDER — DIPHENOXYLATE HYDROCHLORIDE AND ATROPINE SULFATE 2.5; .025 MG/1; MG/1
2 TABLET ORAL EVERY 6 HOURS PRN
Status: DISCONTINUED | OUTPATIENT
Start: 2024-09-26 | End: 2024-09-26 | Stop reason: SDUPTHER

## 2024-09-26 RX ORDER — OXYTOCIN-SODIUM CHLORIDE 0.9% IV SOLN 30 UNIT/500ML 30-0.9/5 UT/ML-%
95 SOLUTION INTRAVENOUS CONTINUOUS PRN
Status: DISCONTINUED | OUTPATIENT
Start: 2024-09-26 | End: 2024-09-28 | Stop reason: HOSPADM

## 2024-09-26 RX ORDER — METHYLERGONOVINE MALEATE 0.2 MG/ML
200 INJECTION INTRAVENOUS ONCE AS NEEDED
Status: DISCONTINUED | OUTPATIENT
Start: 2024-09-26 | End: 2024-09-26 | Stop reason: SDUPTHER

## 2024-09-26 RX ORDER — ONDANSETRON 4 MG/1
8 TABLET, ORALLY DISINTEGRATING ORAL EVERY 8 HOURS PRN
Status: DISCONTINUED | OUTPATIENT
Start: 2024-09-26 | End: 2024-09-26 | Stop reason: SDUPTHER

## 2024-09-26 RX ORDER — OXYTOCIN 10 [USP'U]/ML
10 INJECTION, SOLUTION INTRAMUSCULAR; INTRAVENOUS ONCE AS NEEDED
Status: DISCONTINUED | OUTPATIENT
Start: 2024-09-26 | End: 2024-09-26 | Stop reason: SDUPTHER

## 2024-09-26 RX ORDER — ACETAMINOPHEN 325 MG/1
650 TABLET ORAL EVERY 6 HOURS SCHEDULED
OUTPATIENT
Start: 2024-09-26

## 2024-09-26 RX ORDER — BUPIVACAINE HYDROCHLORIDE 2.5 MG/ML
INJECTION, SOLUTION EPIDURAL; INFILTRATION; INTRACAUDAL
Status: COMPLETED | OUTPATIENT
Start: 2024-09-26 | End: 2024-09-26

## 2024-09-26 RX ORDER — OXYTOCIN-SODIUM CHLORIDE 0.9% IV SOLN 30 UNIT/500ML 30-0.9/5 UT/ML-%
0-32 SOLUTION INTRAVENOUS CONTINUOUS
Status: DISCONTINUED | OUTPATIENT
Start: 2024-09-26 | End: 2024-09-27

## 2024-09-26 RX ORDER — DOCUSATE SODIUM 100 MG/1
200 CAPSULE, LIQUID FILLED ORAL 2 TIMES DAILY PRN
OUTPATIENT
Start: 2024-09-26

## 2024-09-26 RX ORDER — OXYTOCIN 10 [USP'U]/ML
10 INJECTION, SOLUTION INTRAMUSCULAR; INTRAVENOUS ONCE
Status: COMPLETED | OUTPATIENT
Start: 2024-09-26 | End: 2024-09-26

## 2024-09-26 RX ORDER — OXYTOCIN-SODIUM CHLORIDE 0.9% IV SOLN 30 UNIT/500ML 30-0.9/5 UT/ML-%
95 SOLUTION INTRAVENOUS ONCE AS NEEDED
Status: DISCONTINUED | OUTPATIENT
Start: 2024-09-26 | End: 2024-09-28 | Stop reason: HOSPADM

## 2024-09-26 RX ORDER — OXYTOCIN 10 [USP'U]/ML
10 INJECTION, SOLUTION INTRAMUSCULAR; INTRAVENOUS ONCE AS NEEDED
Status: DISCONTINUED | OUTPATIENT
Start: 2024-09-26 | End: 2024-09-28 | Stop reason: HOSPADM

## 2024-09-26 RX ORDER — METHYLERGONOVINE MALEATE 0.2 MG/ML
200 INJECTION INTRAVENOUS ONCE AS NEEDED
Status: DISCONTINUED | OUTPATIENT
Start: 2024-09-26 | End: 2024-09-28 | Stop reason: HOSPADM

## 2024-09-26 RX ORDER — FENTANYL/BUPIVACAINE/NS/PF 2-1250MCG
PLASTIC BAG, INJECTION (ML) INJECTION CONTINUOUS PRN
Status: DISCONTINUED | OUTPATIENT
Start: 2024-09-26 | End: 2024-09-26

## 2024-09-26 RX ORDER — DIPHENHYDRAMINE HCL 25 MG
25 CAPSULE ORAL EVERY 4 HOURS PRN
Status: DISCONTINUED | OUTPATIENT
Start: 2024-09-26 | End: 2024-09-28 | Stop reason: HOSPADM

## 2024-09-26 RX ORDER — MISOPROSTOL 100 UG/1
800 TABLET ORAL ONCE AS NEEDED
Status: DISCONTINUED | OUTPATIENT
Start: 2024-09-26 | End: 2024-09-28 | Stop reason: HOSPADM

## 2024-09-26 RX ORDER — MISOPROSTOL 100 MCG
25 TABLET ORAL EVERY 4 HOURS
Status: DISCONTINUED | OUTPATIENT
Start: 2024-09-26 | End: 2024-09-27

## 2024-09-26 RX ORDER — SODIUM CHLORIDE 0.9 % (FLUSH) 0.9 %
10 SYRINGE (ML) INJECTION
Status: DISCONTINUED | OUTPATIENT
Start: 2024-09-26 | End: 2024-09-28 | Stop reason: HOSPADM

## 2024-09-26 RX ORDER — EPHEDRINE SULFATE 50 MG/ML
10 INJECTION, SOLUTION INTRAVENOUS ONCE AS NEEDED
Status: COMPLETED | OUTPATIENT
Start: 2024-09-26 | End: 2024-09-26

## 2024-09-26 RX ORDER — DOCUSATE SODIUM 100 MG/1
200 CAPSULE, LIQUID FILLED ORAL 2 TIMES DAILY PRN
Status: DISCONTINUED | OUTPATIENT
Start: 2024-09-26 | End: 2024-09-28 | Stop reason: HOSPADM

## 2024-09-26 RX ORDER — DIPHENOXYLATE HYDROCHLORIDE AND ATROPINE SULFATE 2.5; .025 MG/1; MG/1
2 TABLET ORAL EVERY 6 HOURS PRN
Status: DISCONTINUED | OUTPATIENT
Start: 2024-09-26 | End: 2024-09-28 | Stop reason: HOSPADM

## 2024-09-26 RX ORDER — OXYCODONE AND ACETAMINOPHEN 5; 325 MG/1; MG/1
1 TABLET ORAL EVERY 4 HOURS PRN
Status: DISCONTINUED | OUTPATIENT
Start: 2024-09-26 | End: 2024-09-28 | Stop reason: HOSPADM

## 2024-09-26 RX ORDER — PRENATAL WITH FERROUS FUM AND FOLIC ACID 3080; 920; 120; 400; 22; 1.84; 3; 20; 10; 1; 12; 200; 27; 25; 2 [IU]/1; [IU]/1; MG/1; [IU]/1; MG/1; MG/1; MG/1; MG/1; MG/1; MG/1; UG/1; MG/1; MG/1; MG/1; MG/1
1 TABLET ORAL DAILY
OUTPATIENT
Start: 2024-09-27

## 2024-09-26 RX ORDER — MISOPROSTOL 100 UG/1
800 TABLET ORAL ONCE AS NEEDED
Status: DISCONTINUED | OUTPATIENT
Start: 2024-09-26 | End: 2024-09-26 | Stop reason: SDUPTHER

## 2024-09-26 RX ORDER — OXYTOCIN-SODIUM CHLORIDE 0.9% IV SOLN 30 UNIT/500ML 30-0.9/5 UT/ML-%
95 SOLUTION INTRAVENOUS ONCE AS NEEDED
Status: DISCONTINUED | OUTPATIENT
Start: 2024-09-26 | End: 2024-09-26 | Stop reason: SDUPTHER

## 2024-09-26 RX ORDER — IBUPROFEN 600 MG/1
600 TABLET ORAL EVERY 6 HOURS
Status: DISCONTINUED | OUTPATIENT
Start: 2024-09-26 | End: 2024-09-28 | Stop reason: HOSPADM

## 2024-09-26 RX ORDER — PRENATAL WITH FERROUS FUM AND FOLIC ACID 3080; 920; 120; 400; 22; 1.84; 3; 20; 10; 1; 12; 200; 27; 25; 2 [IU]/1; [IU]/1; MG/1; [IU]/1; MG/1; MG/1; MG/1; MG/1; MG/1; MG/1; UG/1; MG/1; MG/1; MG/1; MG/1
1 TABLET ORAL DAILY
Status: DISCONTINUED | OUTPATIENT
Start: 2024-09-27 | End: 2024-09-28 | Stop reason: HOSPADM

## 2024-09-26 RX ORDER — OXYTOCIN-SODIUM CHLORIDE 0.9% IV SOLN 30 UNIT/500ML 30-0.9/5 UT/ML-%
10 SOLUTION INTRAVENOUS ONCE AS NEEDED
Status: DISCONTINUED | OUTPATIENT
Start: 2024-09-26 | End: 2024-09-28 | Stop reason: HOSPADM

## 2024-09-26 RX ORDER — BUTORPHANOL TARTRATE 2 MG/ML
1 INJECTION INTRAMUSCULAR; INTRAVENOUS
Status: DISCONTINUED | OUTPATIENT
Start: 2024-09-26 | End: 2024-09-27

## 2024-09-26 RX ORDER — CARBOPROST TROMETHAMINE 250 UG/ML
250 INJECTION, SOLUTION INTRAMUSCULAR
Status: DISCONTINUED | OUTPATIENT
Start: 2024-09-26 | End: 2024-09-26 | Stop reason: SDUPTHER

## 2024-09-26 RX ORDER — DIPHENHYDRAMINE HCL 25 MG
25 CAPSULE ORAL EVERY 4 HOURS PRN
OUTPATIENT
Start: 2024-09-26

## 2024-09-26 RX ORDER — EPHEDRINE SULFATE 50 MG/ML
INJECTION, SOLUTION INTRAVENOUS
Status: DISPENSED
Start: 2024-09-26 | End: 2024-09-27

## 2024-09-26 RX ADMIN — OXYTOCIN 10 UNITS: 10 INJECTION, SOLUTION INTRAMUSCULAR; INTRAVENOUS at 04:09

## 2024-09-26 RX ADMIN — Medication: at 06:09

## 2024-09-26 RX ADMIN — Medication 2 MILLI-UNITS/MIN: at 11:09

## 2024-09-26 RX ADMIN — URSODIOL 500 MG: 250 TABLET, FILM COATED ORAL at 07:09

## 2024-09-26 RX ADMIN — SODIUM CHLORIDE, POTASSIUM CHLORIDE, SODIUM LACTATE AND CALCIUM CHLORIDE: 600; 310; 30; 20 INJECTION, SOLUTION INTRAVENOUS at 11:09

## 2024-09-26 RX ADMIN — EPHEDRINE SULFATE 10 MG: 50 INJECTION INTRAVENOUS at 01:09

## 2024-09-26 RX ADMIN — METHYLERGONOVINE MALEATE 200 MCG: 0.2 INJECTION, SOLUTION INTRAMUSCULAR; INTRAVENOUS at 04:09

## 2024-09-26 RX ADMIN — IBUPROFEN 600 MG: 600 TABLET, FILM COATED ORAL at 06:09

## 2024-09-26 RX ADMIN — MISOPROSTOL 25 MCG: 100 TABLET ORAL at 06:09

## 2024-09-26 RX ADMIN — BUPIVACAINE HYDROCHLORIDE 10 ML: 2.5 INJECTION, SOLUTION EPIDURAL; INFILTRATION; INTRACAUDAL; PERINEURAL at 01:09

## 2024-09-26 RX ADMIN — SODIUM CHLORIDE, POTASSIUM CHLORIDE, SODIUM LACTATE AND CALCIUM CHLORIDE 1000 ML: 600; 310; 30; 20 INJECTION, SOLUTION INTRAVENOUS at 01:09

## 2024-09-26 RX ADMIN — SODIUM CHLORIDE, POTASSIUM CHLORIDE, SODIUM LACTATE AND CALCIUM CHLORIDE: 600; 310; 30; 20 INJECTION, SOLUTION INTRAVENOUS at 10:09

## 2024-09-26 RX ADMIN — SODIUM CHLORIDE, POTASSIUM CHLORIDE, SODIUM LACTATE AND CALCIUM CHLORIDE: 600; 310; 30; 20 INJECTION, SOLUTION INTRAVENOUS at 02:09

## 2024-09-26 RX ADMIN — Medication 12 ML/HR: at 01:09

## 2024-09-26 RX ADMIN — BUTORPHANOL TARTRATE 1 MG: 2 INJECTION, SOLUTION INTRAMUSCULAR; INTRAVENOUS at 05:09

## 2024-09-26 RX ADMIN — MISOPROSTOL 25 MCG: 100 TABLET ORAL at 02:09

## 2024-09-26 NOTE — PROCEDURES
Date: 9/26/24  Physician: Junaid Washington MD  Procedure: Spontaneous Vaginal Delivery  Induction: cholestasis of pregnancy  Complications: Post partum Hemorrhage  EBL: 800  Uterotonics: methergin, pitocin  Position: ANDREE  Laceration: 1st degree midline perineum  Repair: 2-o vicryl  CT and SH  Placenta: 3 vessel cord, intact  Cord blood: obtained

## 2024-09-26 NOTE — PROGRESS NOTES
LABOR PROGRESS NOTE:     39 y.o.  @ 36w5d undergoing IOL for ICP and fetal decelerations.     Temp:  [97.9 °F (36.6 °C)-98.3 °F (36.8 °C)] 98.3 °F (36.8 °C)  Pulse:  [] 85  Resp:  [14-18] 16  SpO2:  [95 %-100 %] 100 %  BP: ()/(50-68) 115/64    FHT:  140s   Variability: moderate   Accelerations:  Reactive   Decelerations:  Absent   Category:  I   Contractions:  Q3-4 mins       SVE: 3/50/-3       Membranes:  intact   Augmentation: Cervidil @ 0938 on 24, Cytotec @ 0214 and 0653 24   GBS Status:  Negative   Pain: Epidural available upon request       Dispo: Begin pitocin at 11:00 AM and titrate as tolerated     Discussed with Dr. Peña.    Ijeoma Ace MD  [facility] -[facility]

## 2024-09-26 NOTE — ANESTHESIA PROCEDURE NOTES
Epidural    Patient location during procedure: OB   Reason for block: primary anesthetic   Reason for block: labor analgesia requested by patient and obstetrician  Diagnosis: IUP   Start time: 9/26/2024 1:04 PM  Timeout: 9/26/2024 1:04 PM  End time: 9/26/2024 1:27 PM    Staffing  Performing Provider: Orlando Reno MD  Authorizing Provider: Orlando Reno MD    Staffing  Performed by: Orlando Reno MD  Authorized by: Orlando Reno MD        Preanesthetic Checklist  Completed: patient identified, IV checked, site marked, risks and benefits discussed, surgical consent, monitors and equipment checked, pre-op evaluation, timeout performed, anesthesia consent given, hand hygiene performed and patient being monitored  Preparation  Patient position: sitting  Prep: ChloraPrep  Reason for block: primary anesthetic   Epidural  Skin Anesthetic: lidocaine 1%  Administration type: continuous  Approach: midline  Interspace: L3-4    Injection technique: SERGIO saline  Needle and Epidural Catheter  Needle type: Tuohy   Needle gauge: 17  Needle length: 3.5 inches  Catheter type: multi-orifice  Catheter size: 19 G  Insertion Attempts: 1  Test dose: 3 mL of lidocaine 1.5% with Epi 1-to-200,000  Additional Documentation: negative aspiration for heme and CSF, no signs/symptoms of IV or SA injection, no significant complaints from patient, no paresthesia on injection and no significant pain on injection  Needle localization: anatomical landmarks  Assessment  Patient's tolerance of the procedure: no complaints  Additional Notes  Placed w/ some difficulty.  2 Attempt  Cath 3cm cephalad w/o heme, CSF, or paresthesia.  Continuous infusion 0.125% Bupivacaine w/ Fentanyl 2 mcg/cc at 12 cc/h    See Labor Record for Vitals No inadvertent dural puncture with Tuohy.  Dural puncture not performed with spinal needle    Medications:    Medications: bupivacaine (pf) (MARCAINE) injection 0.25% - Epidural   10 mL - 9/26/2024 1:19:00  PM

## 2024-09-26 NOTE — NURSING
MD and residents at the bedside to perform sve and AROM. Dr. Knight performed sve and AROM without success. Dr. Peña attempted to AROM pt as well without success. MD states to start pitocin at 1100 and they will come back after that.

## 2024-09-26 NOTE — ANESTHESIA PREPROCEDURE EVALUATION
2024  Camilla Cruz is a 39 y.o., female.  /67   Pulse 71   Temp 36.9 °C (98.5 °F)   Resp 16   LMP 2024 (Exact Date)   SpO2 99%   Breastfeeding No     Past Medical History:   Diagnosis Date    Anemia     Liver disease        OB History    Para Term  AB Living   6 4 1 3 1 4   SAB IAB Ectopic Multiple Live Births   0 0 0 0 4      # Outcome Date GA Lbr Zachary/2nd Weight Sex Type Anes PTL Lv   6 Current            5  20 36w0d  3.175 kg (7 lb) M Vag-Spont EPI N MALGORZATA      Complications: Cholestasis during pregnancy   4  18 36w0d  3.43 kg (7 lb 9 oz) M Vag-Spont EPI N MALGORZATA      Complications: Cholestasis during pregnancy   3  12 36w0d  3.374 kg (7 lb 7 oz) F Vag-Spont EPI N MALGORZATA      Complications: Cholestasis during pregnancy   2 Term 09 39w0d  2.977 kg (6 lb 9 oz) M Vag-Spont EPI N MALGORZATA      Complications: Cholestasis during pregnancy   1 AB 2007 8w0d    TAB   FD       Wt Readings from Last 1 Encounters:   24 0924 82.9 kg (182 lb 12.8 oz)       BP Readings from Last 3 Encounters:   24 111/67   24 110/72   24 127/70       There is no problem list on file for this patient.      History reviewed. No pertinent surgical history.    Social History     Socioeconomic History    Marital status: Single   Tobacco Use    Smoking status: Never     Passive exposure: Never    Smokeless tobacco: Never   Substance and Sexual Activity    Alcohol use: Never    Drug use: Never    Sexual activity: Not Currently     Social Determinants of Health     Financial Resource Strain: Low Risk  (2024)    Overall Financial Resource Strain (CARDIA)     Difficulty of Paying Living Expenses: Not hard at all   Food Insecurity: No Food Insecurity (2024)    Hunger Vital Sign     Worried About Running Out of Food in the Last Year:  "Never true     Ran Out of Food in the Last Year: Never true   Transportation Needs: No Transportation Needs (9/24/2024)    TRANSPORTATION NEEDS     Transportation : No   Physical Activity: Sufficiently Active (9/24/2024)    Exercise Vital Sign     Days of Exercise per Week: 6 days     Minutes of Exercise per Session: 30 min   Stress: Stress Concern Present (9/24/2024)    [employer] - Occupational Stress Questionnaire     Feeling of Stress : To some extent   Housing Stability: Low Risk  (9/24/2024)    Housing Stability Vital Sign     Unable to Pay for Housing in the Last Year: No     Homeless in the Last Year: No         Chemistry        Component Value Date/Time     09/26/2024 0520    K 3.8 09/26/2024 0520     (H) 09/26/2024 0520    CO2 15 (L) 09/26/2024 0520    BUN 11.3 09/26/2024 0520    CREATININE 0.55 09/26/2024 0520        Component Value Date/Time    CALCIUM 8.7 09/26/2024 0520    ALKPHOS 240 (H) 09/26/2024 0520    AST 90 (H) 09/26/2024 0520     (H) 09/26/2024 0520    BILITOT 0.5 09/26/2024 0520            Lab Results   Component Value Date    WBC 6.81 09/24/2024    HGB 11.1 (L) 09/24/2024    HCT 34.2 (L) 09/24/2024    MCV 87.7 09/24/2024     09/24/2024       No results for input(s): "PT", "INR", "PROTIME", "APTT" in the last 72 hours.                Pre-op Assessment    I have reviewed the Patient Summary Reports.    I have reviewed the NPO Status.   I have reviewed the Medications.     Review of Systems  Anesthesia Hx:  No problems with previous Anesthesia             Denies Family Hx of Anesthesia complications.    Denies Personal Hx of Anesthesia complications.                    Cardiovascular:  Cardiovascular Normal                   No Cardiac Complaints                         Pulmonary:  Pulmonary Normal        No Pulmonary Complaints               Hepatic/GI:        No Current GERD Sx              Physical Exam  General: Alert and " Oriented    Airway:  Mallampati: II   Mouth Opening: Normal  TM Distance: Normal  Tongue: Normal  Neck ROM: Normal ROM    Dental:  Intact    Chest/Lungs:  Clear to auscultation, Normal Respiratory Rate    Heart:  Rate: Normal  Rhythm: Regular Rhythm        Anesthesia Plan  Type of Anesthesia, risks & benefits discussed:    Anesthesia Type: Epidural  Intra-op Monitoring Plan: Standard ASA Monitors  Post Op Pain Control Plan: epidural analgesia  Induction:  IV  Airway Plan: Direct  Informed Consent: Informed consent signed with the Patient and all parties understand the risks and agree with anesthesia plan.  All questions answered. Patient consented to blood products? Yes  ASA Score: 2  Day of Surgery Review of History & Physical: H&P Update referred to the surgeon/provider.  Anesthesia Plan Notes: Premedication: Oral Bicitra and Lactated Ringers Preload  Disc placement of MORGAN, possibility of no/partial relief, risk of headache. Questions entertained, accepted.  Technique: Epidural for labor analgesia - plan to use if functioning well if C/S is required for delivery  Please refer to nursing records for toco fetal heart tone information        Ready For Surgery From Anesthesia Perspective.     .

## 2024-09-27 LAB
ALBUMIN SERPL-MCNC: 2.2 G/DL (ref 3.5–5)
ALBUMIN/GLOB SERPL: 0.7 RATIO (ref 1.1–2)
ALP SERPL-CCNC: 201 UNIT/L (ref 40–150)
ALT SERPL-CCNC: 148 UNIT/L (ref 0–55)
ANION GAP SERPL CALC-SCNC: 6 MEQ/L
AST SERPL-CCNC: 61 UNIT/L (ref 5–34)
BACTERIA SPEC CULT: NORMAL
BASOPHILS # BLD AUTO: 0.02 X10(3)/MCL
BASOPHILS NFR BLD AUTO: 0.2 %
BILIRUB SERPL-MCNC: 0.3 MG/DL
BUN SERPL-MCNC: 10 MG/DL (ref 7–18.7)
CALCIUM SERPL-MCNC: 8.9 MG/DL (ref 8.4–10.2)
CHLORIDE SERPL-SCNC: 112 MMOL/L (ref 98–107)
CO2 SERPL-SCNC: 21 MMOL/L (ref 22–29)
CREAT SERPL-MCNC: 0.67 MG/DL (ref 0.55–1.02)
CREAT/UREA NIT SERPL: 15
EOSINOPHIL # BLD AUTO: 0.05 X10(3)/MCL (ref 0–0.9)
EOSINOPHIL NFR BLD AUTO: 0.6 %
ERYTHROCYTE [DISTWIDTH] IN BLOOD BY AUTOMATED COUNT: 14.3 % (ref 11.5–17)
GFR SERPLBLD CREATININE-BSD FMLA CKD-EPI: >60 ML/MIN/1.73/M2
GLOBULIN SER-MCNC: 3 GM/DL (ref 2.4–3.5)
GLUCOSE SERPL-MCNC: 142 MG/DL (ref 74–100)
HCT VFR BLD AUTO: 28.3 % (ref 37–47)
HGB BLD-MCNC: 9.3 G/DL (ref 12–16)
HIGH RISK HPV: NEGATIVE
IMM GRANULOCYTES # BLD AUTO: 0.05 X10(3)/MCL (ref 0–0.04)
IMM GRANULOCYTES NFR BLD AUTO: 0.6 %
LYMPHOCYTES # BLD AUTO: 1.62 X10(3)/MCL (ref 0.6–4.6)
LYMPHOCYTES NFR BLD AUTO: 18.2 %
MCH RBC QN AUTO: 28.7 PG (ref 27–31)
MCHC RBC AUTO-ENTMCNC: 32.9 G/DL (ref 33–36)
MCV RBC AUTO: 87.3 FL (ref 80–94)
MONOCYTES # BLD AUTO: 1.05 X10(3)/MCL (ref 0.1–1.3)
MONOCYTES NFR BLD AUTO: 11.8 %
NEUTROPHILS # BLD AUTO: 6.09 X10(3)/MCL (ref 2.1–9.2)
NEUTROPHILS NFR BLD AUTO: 68.6 %
NRBC BLD AUTO-RTO: 0 %
PLATELET # BLD AUTO: 171 X10(3)/MCL (ref 130–400)
PMV BLD AUTO: 11.4 FL (ref 7.4–10.4)
POCT GLUCOSE: 71 MG/DL (ref 70–110)
POCT GLUCOSE: 83 MG/DL (ref 70–110)
POTASSIUM SERPL-SCNC: 4.1 MMOL/L (ref 3.5–5.1)
PROT SERPL-MCNC: 5.2 GM/DL (ref 6.4–8.3)
PSYCHE PATHOLOGY RESULT: NORMAL
RBC # BLD AUTO: 3.24 X10(6)/MCL (ref 4.2–5.4)
SODIUM SERPL-SCNC: 139 MMOL/L (ref 136–145)
WBC # BLD AUTO: 8.88 X10(3)/MCL (ref 4.5–11.5)

## 2024-09-27 PROCEDURE — 36415 COLL VENOUS BLD VENIPUNCTURE: CPT | Performed by: OBSTETRICS & GYNECOLOGY

## 2024-09-27 PROCEDURE — 80053 COMPREHEN METABOLIC PANEL: CPT | Performed by: OBSTETRICS & GYNECOLOGY

## 2024-09-27 PROCEDURE — 25000003 PHARM REV CODE 250

## 2024-09-27 PROCEDURE — 25000003 PHARM REV CODE 250: Performed by: OBSTETRICS & GYNECOLOGY

## 2024-09-27 PROCEDURE — 85025 COMPLETE CBC W/AUTO DIFF WBC: CPT | Performed by: OBSTETRICS & GYNECOLOGY

## 2024-09-27 PROCEDURE — 11000001 HC ACUTE MED/SURG PRIVATE ROOM

## 2024-09-27 RX ADMIN — PRENATAL VITAMINS-IRON FUMARATE 27 MG IRON-FOLIC ACID 0.8 MG TABLET 1 TABLET: at 11:09

## 2024-09-27 RX ADMIN — DOCUSATE SODIUM 200 MG: 100 CAPSULE, LIQUID FILLED ORAL at 11:09

## 2024-09-27 RX ADMIN — IBUPROFEN 600 MG: 600 TABLET, FILM COATED ORAL at 12:09

## 2024-09-27 RX ADMIN — IBUPROFEN 600 MG: 600 TABLET, FILM COATED ORAL at 07:09

## 2024-09-27 RX ADMIN — IBUPROFEN 600 MG: 600 TABLET, FILM COATED ORAL at 11:09

## 2024-09-27 RX ADMIN — DOCUSATE SODIUM 200 MG: 100 CAPSULE, LIQUID FILLED ORAL at 09:09

## 2024-09-27 RX ADMIN — IBUPROFEN 600 MG: 600 TABLET, FILM COATED ORAL at 06:09

## 2024-09-27 NOTE — PROGRESS NOTES
Post Vaginal Delivery Progress Note:     Josephine Cruz is a 39 y.o.  with ICP and GDM s/p , PPD#1. Patient resting comfortably in bed. Pain is well controlled. Lochia similar to her menses. Tolerating regular diet without nausea/vomiting. Ambulating comfortably, voiding spontaneously, and passing flatus. Infant at bedside.     Denies headache, blurry vision, dizziness, chest pain, shortness of breath, RUQ pain, or calf pain. .    Objective:     Vitals:    24 1804 24 1808 24 1907 24 2357   BP:   122/77 106/67   Pulse: 73 75 83 85   Resp:    17   Temp:   98.8 °F (37.1 °C) 98.4 °F (36.9 °C)   TempSrc:   Oral Oral   SpO2:   97% 96%       General:  Alert and oriented, in no acute distress   Lungs:  Clear to auscultation bilaterally   Heart::  Regular rate and rhythm   Abdomen:   Soft, nondistended, normoactive bowel sounds   Uterine Fundus:   Firm, just above the umbilicus   Extremities:  No cords, erythema, warmth, or tenderness to palpation in bilateral lower extremities; trace edema     Labs  Recent Results (from the past 24 hour(s))   CBC with Differential    Collection Time: 24  8:08 PM   Result Value Ref Range    WBC 8.89 4.50 - 11.50 x10(3)/mcL    RBC 3.55 (L) 4.20 - 5.40 x10(6)/mcL    Hgb 10.2 (L) 12.0 - 16.0 g/dL    Hct 31.6 (L) 37.0 - 47.0 %    MCV 89.0 80.0 - 94.0 fL    MCH 28.7 27.0 - 31.0 pg    MCHC 32.3 (L) 33.0 - 36.0 g/dL    RDW 14.5 11.5 - 17.0 %    Platelet 148 130 - 400 x10(3)/mcL    MPV 10.8 (H) 7.4 - 10.4 fL    Neut % 80.3 %    Lymph % 12.1 %    Mono % 6.6 %    Eos % 0.2 %    Basophil % 0.2 %    Lymph # 1.08 0.6 - 4.6 x10(3)/mcL    Neut # 7.13 2.1 - 9.2 x10(3)/mcL    Mono # 0.59 0.1 - 1.3 x10(3)/mcL    Eos # 0.02 0 - 0.9 x10(3)/mcL    Baso # 0.02 <=0.2 x10(3)/mcL    IG# 0.05 (H) 0 - 0.04 x10(3)/mcL    IG% 0.6 %    NRBC% 0.0 %   Comprehensive Metabolic Panel    Collection Time: 24  3:53 AM   Result Value Ref Range    Sodium 139 136 -  145 mmol/L    Potassium 4.1 3.5 - 5.1 mmol/L    Chloride 112 (H) 98 - 107 mmol/L    CO2 21 (L) 22 - 29 mmol/L    Glucose 142 (H) 74 - 100 mg/dL    Blood Urea Nitrogen 10.0 7.0 - 18.7 mg/dL    Creatinine 0.67 0.55 - 1.02 mg/dL    Calcium 8.9 8.4 - 10.2 mg/dL    Protein Total 5.2 (L) 6.4 - 8.3 gm/dL    Albumin 2.2 (L) 3.5 - 5.0 g/dL    Globulin 3.0 2.4 - 3.5 gm/dL    Albumin/Globulin Ratio 0.7 (L) 1.1 - 2.0 ratio    Bilirubin Total 0.3 <=1.5 mg/dL     (H) 40 - 150 unit/L     (H) 0 - 55 unit/L    AST 61 (H) 5 - 34 unit/L    eGFR >60 mL/min/1.73/m2    Anion Gap 6.0 mEq/L    BUN/Creatinine Ratio 15    CBC with Differential    Collection Time: 09/27/24  3:53 AM   Result Value Ref Range    WBC 8.88 4.50 - 11.50 x10(3)/mcL    RBC 3.24 (L) 4.20 - 5.40 x10(6)/mcL    Hgb 9.3 (L) 12.0 - 16.0 g/dL    Hct 28.3 (L) 37.0 - 47.0 %    MCV 87.3 80.0 - 94.0 fL    MCH 28.7 27.0 - 31.0 pg    MCHC 32.9 (L) 33.0 - 36.0 g/dL    RDW 14.3 11.5 - 17.0 %    Platelet 171 130 - 400 x10(3)/mcL    MPV 11.4 (H) 7.4 - 10.4 fL    Neut % 68.6 %    Lymph % 18.2 %    Mono % 11.8 %    Eos % 0.6 %    Basophil % 0.2 %    Lymph # 1.62 0.6 - 4.6 x10(3)/mcL    Neut # 6.09 2.1 - 9.2 x10(3)/mcL    Mono # 1.05 0.1 - 1.3 x10(3)/mcL    Eos # 0.05 0 - 0.9 x10(3)/mcL    Baso # 0.02 <=0.2 x10(3)/mcL    IG# 0.05 (H) 0 - 0.04 x10(3)/mcL    IG% 0.6 %    NRBC% 0.0 %       Trended Lab Data:  Recent Labs   Lab 09/24/24  1054 09/26/24 2008 09/27/24  0353   WBC 6.81 8.89 8.88   HGB 11.1* 10.2* 9.3*    148 171   MCV 87.7 89.0 87.3       Medications   ibuprofen  600 mg Oral Q6H    lactated ringers  1,000 mL Intravenous Once    miSOPROStoL  25 mcg Vaginal Q4H    mupirocin   Nasal BID    prenatal vitamin  1 tablet Oral Daily    prenatal vitamin  1 tablet Oral Daily    sodium citrate-citric acid 500-334 mg/5 ml  30 mL Oral Once    ursodioL  500 mg Oral BID WM      benzocaine-lanolin   Topical (Top) Continuous PRN   Given at 09/26/24 1854    lactated ringers    Intravenous Continuous 125 mL/hr at 24 1128 Rate Verify at 24 1128    oxytocin  95 trice-units/min Intravenous Continuous PRN        oxytocin  0-32 trice-units/min Intravenous Continuous 16 mL/hr at 24 1620 16 trice-units/min at 24 1620       Assessment/Plan  39 y.o.  s/p , PPD# 1. Clinically stable and doing well. Pregnancy/postpartum course complicated by: intrahepatic cholestasis of pregnancy, gestational diabetes, 1st degree midline perineal tear.     Continue routine postpartum care.   Intrahepatic cholestasis of pregnancy: discontinue ursodiol. AM labs showing improvement in liver enzymes.   Gestational diabetes: Discontinue CBGs. CBGs have all been within goal range, not requiring SSI.   Asymptomatic anemia:  Antepartum H/H 10.2/31.6 -->  mL--> postpartum H/H 9.3/28.3.   No signs/symptoms of anemia this AM, continue to monitor  Feeding: Breast and bottle feeding, continue to encourage.  Pain: scheduled ibuprofen, PRN oxycodone for breakthrough pain.  PPBCM: undecided, discuss outpatient   Dispo: continue to monitor, anticipate discharge to home on postpartum day #2 if meeting all goals.    Will discuss patient and plan of care with Dr. Leann Ace MD  [facility]

## 2024-09-27 NOTE — CONSULTS
Camilla Cruz, 39/F, has been referred to CM for resources assessment due to limited prenatal care. No MDS/UDS ordered for collection on baby and no UDS collected on mom on admission.   Met with mom in her post-partum room, where mom presented appropriate and was cooperative. Mom was alert and no signs of distress present during assessment. Mom was well groomed and expressed appropriate concerns for her  son, Angelito Caruso. I utilized  ID # [id number] to ensure effective communication. I introduced myself and explained my role and mom was agreeable to meet.   Mom reported she has four other children, ages: 15, 12, 6, and 4). Mom reported Fob is not involved, yet she reported to having help at home and identified her cousin. Mom reported she has only one prenatal visit with [facility] family clinic due to not having insurance at the time. She reported she now has active medicaid coverage. Mom has plans to formal feed and we discussed WIC and SNAP benefits and I provided [place] literature and other community resources.  Mom reported to having all necessary supplies; including car seat and a crib. Provided safe sleep and car seat educational resources in Monegasque and english. Mom denied mental health and denied substance use.   Mom reported to having adequate DC transportation.   No additional concerns identified and mom had no additional questions.  Face sheet information verified:  [address]   [address]   [place] [id number]  [phone]    Updated RN on completed assessment.     Family is socially cleared.

## 2024-09-27 NOTE — PLAN OF CARE
Problem: Adult Inpatient Plan of Care  Goal: Plan of Care Review  Outcome: Progressing  Goal: Patient-Specific Goal (Individualized)  Outcome: Progressing  Goal: Absence of Hospital-Acquired Illness or Injury  Outcome: Progressing  Goal: Optimal Comfort and Wellbeing  Outcome: Progressing  Goal: Readiness for Transition of Care  Outcome: Progressing     Problem:  Fall Injury Risk  Goal: Absence of Fall, Infant Drop and Related Injury  Outcome: Progressing     Problem: Infection  Goal: Absence of Infection Signs and Symptoms  Outcome: Progressing     Problem: Labor  Goal: Hemostasis  Outcome: Progressing  Goal: Stable Fetal Wellbeing  Outcome: Progressing  Goal: Effective Progression to Delivery  Outcome: Progressing  Goal: Absence of Infection Signs and Symptoms  Outcome: Progressing  Goal: Acceptable Pain Control  Outcome: Progressing  Goal: Normal Uterine Contraction Pattern  Outcome: Progressing     Problem: Diabetes in Pregnancy  Goal: Optimal Coping  Outcome: Progressing  Goal: Blood Glucose Level Within Targeted Range  Outcome: Progressing

## 2024-09-27 NOTE — ANESTHESIA POSTPROCEDURE EVALUATION
Anesthesia Post Evaluation    Patient: Camilla Cruz    Procedure(s) Performed: * No procedures listed *    Final Anesthesia Type: epidural      Patient location during evaluation: PACU  Patient participation: Yes- Able to Participate  Level of consciousness: awake and alert and oriented  Post-procedure vital signs: reviewed and stable  Pain management: adequate  Airway patency: patent  DARÍO mitigation strategies: Use of major conduction anesthesia (spinal/epidural) or peripheral nerve block  PONV status at discharge: No PONV  Anesthetic complications: no      Cardiovascular status: blood pressure returned to baseline and stable  Respiratory status: unassisted  Hydration status: euvolemic  Follow-up not needed.  Comments: EPIDURAL BLK RESOLVED , SENSORY MOTOR INTACT, DENIES HEADACHE.                Vitals Value Taken Time   BP 96/60 09/27/24 0817   Temp 36.5 °C (97.7 °F) 09/27/24 0817   Pulse 71 09/27/24 0817   Resp 17 09/26/24 2357   SpO2 97 % 09/27/24 0817         No case tracking events are documented in the log.      Pain/Rosanne Score: Pain Rating Prior to Med Admin: 5 (9/27/2024 11:56 AM)

## 2024-09-28 VITALS
OXYGEN SATURATION: 97 % | DIASTOLIC BLOOD PRESSURE: 62 MMHG | TEMPERATURE: 98 F | HEART RATE: 66 BPM | RESPIRATION RATE: 17 BRPM | SYSTOLIC BLOOD PRESSURE: 98 MMHG

## 2024-09-28 LAB
ALBUMIN SERPL-MCNC: 2.1 G/DL (ref 3.5–5)
ALBUMIN/GLOB SERPL: 0.8 RATIO (ref 1.1–2)
ALP SERPL-CCNC: 143 UNIT/L (ref 40–150)
ALT SERPL-CCNC: 105 UNIT/L (ref 0–55)
ANION GAP SERPL CALC-SCNC: 5 MEQ/L
AST SERPL-CCNC: 33 UNIT/L (ref 5–34)
BILIRUB SERPL-MCNC: 0.2 MG/DL
BUN SERPL-MCNC: 14 MG/DL (ref 7–18.7)
CALCIUM SERPL-MCNC: 8 MG/DL (ref 8.4–10.2)
CHLORIDE SERPL-SCNC: 114 MMOL/L (ref 98–107)
CO2 SERPL-SCNC: 22 MMOL/L (ref 22–29)
CREAT SERPL-MCNC: 0.68 MG/DL (ref 0.55–1.02)
CREAT/UREA NIT SERPL: 21
GFR SERPLBLD CREATININE-BSD FMLA CKD-EPI: >60 ML/MIN/1.73/M2
GLOBULIN SER-MCNC: 2.7 GM/DL (ref 2.4–3.5)
GLUCOSE SERPL-MCNC: 124 MG/DL (ref 74–100)
POTASSIUM SERPL-SCNC: 4 MMOL/L (ref 3.5–5.1)
PROT SERPL-MCNC: 4.8 GM/DL (ref 6.4–8.3)
SODIUM SERPL-SCNC: 141 MMOL/L (ref 136–145)

## 2024-09-28 PROCEDURE — 72100003 HC LABOR CARE, EA. ADDL. 8 HRS

## 2024-09-28 PROCEDURE — 25000003 PHARM REV CODE 250: Performed by: OBSTETRICS & GYNECOLOGY

## 2024-09-28 PROCEDURE — 36415 COLL VENOUS BLD VENIPUNCTURE: CPT | Performed by: OBSTETRICS & GYNECOLOGY

## 2024-09-28 PROCEDURE — 80053 COMPREHEN METABOLIC PANEL: CPT | Performed by: OBSTETRICS & GYNECOLOGY

## 2024-09-28 RX ORDER — ACETAMINOPHEN 325 MG/1
650 TABLET ORAL EVERY 6 HOURS PRN
Qty: 20 TABLET | Refills: 0 | Status: SHIPPED | OUTPATIENT
Start: 2024-09-28

## 2024-09-28 RX ORDER — IBUPROFEN 600 MG/1
600 TABLET ORAL EVERY 6 HOURS PRN
Qty: 20 TABLET | Refills: 0 | Status: SHIPPED | OUTPATIENT
Start: 2024-09-28

## 2024-09-28 RX ADMIN — PRENATAL VITAMINS-IRON FUMARATE 27 MG IRON-FOLIC ACID 0.8 MG TABLET 1 TABLET: at 08:09

## 2024-09-28 RX ADMIN — IBUPROFEN 600 MG: 600 TABLET, FILM COATED ORAL at 08:09

## 2024-09-28 RX ADMIN — DOCUSATE SODIUM 200 MG: 100 CAPSULE, LIQUID FILLED ORAL at 08:09

## 2024-09-28 NOTE — DISCHARGE SUMMARY
Delivery Discharge Summary  [facility]    Admission date: 2024  Discharge date: 2024    Admit Dx:   Intrahepatic cholestasis of pregnancy  Gestational diabetes mellitus     Discharge Dx:    S/p spontaneous vaginal delivery    Procedure:     Hospital Course:  Camilla Cruz is a 39 y.o. now  female who was admitted on 2024 for workup of ICP. MFM was consulted; recommended IOL. She had the benefit of an epidural, and external fetal monitoring. Patient and infant tolerated labor well with pitocin. Patient delivered a viable  via  at 36w5d on 24. Apgars 9/9. Vaginal exam after delivery significant for 1st degree midline perineal laceration which was repaired.  cc.  Review the Delivery Report for details.  Pt was in stable condition post delivery and was transferred to the Mother-Baby Unit. Her postpartum course was uncomplicated. On the date of discharge, patient's pain is controlled with oral pain medications. No fever or chills. She is ambulating without SOB or CP, and tolerating PO diet without N/V. Good urinary and bowel function. Reported lochia is within the normal range. Patient has had no complaints or questions that were not addressed during the duration of her stay. Pt in stable condition and ready for discharge on PPD 2.     Physical exam:   Vitals:  Temp:  [97.9 °F (36.6 °C)-98 °F (36.7 °C)]   Pulse:  [66-86]   BP: (95-98)/(60-62)   SpO2:  [97 %-98 %]   Gen: AAO x 3, NAD  HEENT: NCAT, MMM  CV: RRR  Resp: Clear to auscultation bilaterally with no wheezing, stridor, or upper airways sounds, good air movement, nontender chest  Abd: soft, +bowel sounds  : uterus firm below umbilicus, lochia scant  Ext: no edema, DP/Radial 2+     Pertinent studies:    Delivery:    Episiotomy: None   Lacerations: 1st   Repair suture:     Repair # of packets: 1   Blood loss (ml):       Birth information:  YOB: 2023   Time of birth: 4:29 PM   Sex:  "male   Delivery type: Vaginal, Spontaneous   Gestational Age: 36w5d     Measurements    Weight: 3450 g  Weight (lbs): 7 lb 9.7 oz  Length: 52.1 cm  Length (in): 20.5"  Head circumference: 33 cm         Delivery Clinician: Delivery Providers    Delivering clinician: Junaid Washington MD   Provider Role    Nina Bonilla, RN Registered Nurse    Cynthia Alamo RN Registered Nurse    Elli Dimas, [facility]    Griselda Mcarthur RN Registered Nurse    Mayra Loya RN Registered Nurse             Additional  information:  Forceps:    Vacuum:    Breech:    Observed anomalies      Living?:     Apgars    Living status: Living  Apgar Component Scores:  1 min.:  5 min.:  10 min.:  15 min.:  20 min.:    Skin color:  1  1       Heart rate:  2  2       Reflex irritability:  2  2       Muscle tone:  2  2       Respiratory effort:  2  2       Total:  9  9       Apgars assigned by: MARÍA LOYA RN-NICU         Placenta: Delivered:       appearance    Labs:   Patient's Prenatal labs reviewed -   Blood Type:  Lab Results   Component Value Date    GROUPTRH A POS 2024      Rubella Immune Status   Lab Results   Component Value Date    RUBABIGG Positive 2024     Varicella Immune Status   Lab Results   Component Value Date    VZABGS Positive 2024     Most recent H/H:  Recent Labs   Lab 24  0353   HGB 9.3*   HCT 28.3*       Disposition: To home, self care    Follow Up: [facility] in 6 weeks    Patient Instructions:     1. Report to OB ED or call clinic for any bleeding >2 pads/hour for >2 hours, temperature >100.4, foul smelling discharge, pain uncontrolled with medications,or or for any other concerns.  2. Pelvic rest x6 weeks and no tub baths x 2 weeks  3. Rx for Motrin and Tylenol provided  4. Post partum contraception: desires Nexplanon , discuss outpatient     Current Discharge Medication List        START taking these medications    Details   acetaminophen (TYLENOL) 325 MG tablet Take 2 tablets (650 " mg total) by mouth every 6 (six) hours as needed for Pain.  Qty: 20 tablet, Refills: 0      ibuprofen (ADVIL,MOTRIN) 600 MG tablet Take 1 tablet (600 mg total) by mouth every 6 (six) hours as needed for Pain.  Qty: 20 tablet, Refills: 0           CONTINUE these medications which have NOT CHANGED    Details   docusate sodium (COLACE) 100 MG capsule Take 1 capsule (100 mg total) by mouth 2 (two) times daily.  Qty: 60 capsule, Refills: 0      prenatal vit/iron fum/folic ac (PRENATAL 1+1 ORAL) Take by mouth.             Time spent on discharge: 30    Ijeoma Ace MD  [facility] Resident, [facility]

## 2024-09-28 NOTE — PLAN OF CARE
Problem: Adult Inpatient Plan of Care  Goal: Plan of Care Review  Outcome: Met  Goal: Patient-Specific Goal (Individualized)  Outcome: Met  Goal: Absence of Hospital-Acquired Illness or Injury  Outcome: Met  Goal: Optimal Comfort and Wellbeing  Outcome: Met  Goal: Readiness for Transition of Care  Outcome: Met     Problem:  Fall Injury Risk  Goal: Absence of Fall, Infant Drop and Related Injury  Outcome: Met     Problem: Infection  Goal: Absence of Infection Signs and Symptoms  Outcome: Met     Problem: Labor  Goal: Hemostasis  Outcome: Met  Goal: Stable Fetal Wellbeing  Outcome: Met  Goal: Effective Progression to Delivery  Outcome: Met  Goal: Absence of Infection Signs and Symptoms  Outcome: Met  Goal: Acceptable Pain Control  Outcome: Met  Goal: Normal Uterine Contraction Pattern  Outcome: Met     Problem: Diabetes in Pregnancy  Goal: Optimal Coping  Outcome: Met  Goal: Blood Glucose Level Within Targeted Range  Outcome: Met